# Patient Record
Sex: FEMALE | Race: WHITE | NOT HISPANIC OR LATINO | Employment: PART TIME
[De-identification: names, ages, dates, MRNs, and addresses within clinical notes are randomized per-mention and may not be internally consistent; named-entity substitution may affect disease eponyms.]

---

## 2012-08-21 LAB — COLONOSCOPY STUDY: NORMAL

## 2017-05-24 ENCOUNTER — IMAGING SERVICES (OUTPATIENT)
Dept: OTHER | Age: 72
End: 2017-05-24

## 2017-05-24 ENCOUNTER — HOSPITAL (OUTPATIENT)
Dept: OTHER | Age: 72
End: 2017-05-24
Attending: INTERNAL MEDICINE

## 2017-05-24 ENCOUNTER — LAB SERVICES (OUTPATIENT)
Dept: OTHER | Age: 72
End: 2017-05-24

## 2017-06-05 ENCOUNTER — CHARTING TRANS (OUTPATIENT)
Dept: OTHER | Age: 72
End: 2017-06-05

## 2017-06-05 LAB
ALBUMIN SERPL-MCNC: 3.9 G/DL (ref 3.6–5.1)
ALBUMIN/GLOB SERPL: 1.1 (ref 1–2.4)
ALP SERPL-CCNC: 60 UNITS/L (ref 45–117)
ALT SERPL-CCNC: 31 UNITS/L
ANION GAP SERPL CALC-SCNC: 11 MMOL/L (ref 10–20)
AST SERPL-CCNC: 21 UNITS/L
BASOPHILS # BLD: 0.1 K/MCL (ref 0–0.3)
BASOPHILS NFR BLD: 1 %
BILIRUB SERPL-MCNC: 0.4 MG/DL (ref 0.2–1)
BUN SERPL-MCNC: 24 MG/DL (ref 6–20)
BUN/CREAT SERPL: 28 (ref 7–25)
CALCIUM SERPL-MCNC: 9 MG/DL (ref 8.4–10.2)
CHLORIDE SERPL-SCNC: 103 MMOL/L (ref 98–107)
CHOLEST SERPL-MCNC: 162 MG/DL
CHOLEST/HDLC SERPL: 2.1
CO2 SERPL-SCNC: 30 MMOL/L (ref 21–32)
CREAT SERPL-MCNC: 0.86 MG/DL (ref 0.51–0.95)
DIFFERENTIAL METHOD BLD: ABNORMAL
EOSINOPHIL # BLD: 0.2 K/MCL (ref 0.1–0.5)
EOSINOPHIL NFR BLD: 2 %
ERYTHROCYTE [DISTWIDTH] IN BLOOD: 13.9 % (ref 11–15)
GLOBULIN SER-MCNC: 3.6 G/DL (ref 2–4)
GLUCOSE SERPL-MCNC: 99 MG/DL (ref 65–99)
HBA1C MFR BLD: 6.1 % (ref 4.5–5.6)
HDLC SERPL-MCNC: 78 MG/DL
HEMATOCRIT: 41.1 % (ref 36–46.5)
HEMOGLOBIN: 13.5 G/DL (ref 12–15.5)
LDLC SERPL CALC-MCNC: 60 MG/DL
LENGTH OF FAST TIME PATIENT: 12 HRS
LENGTH OF FAST TIME PATIENT: 12 HRS
LYMPHOCYTES # BLD: 2 K/MCL (ref 1–4)
LYMPHOCYTES NFR BLD: 24 %
MEAN CORPUSCULAR HEMOGLOBIN: 30.5 PG (ref 26–34)
MEAN CORPUSCULAR HGB CONC: 32.8 G/DL (ref 32–36.5)
MEAN CORPUSCULAR VOLUME: 92.8 FL (ref 78–100)
MONOCYTES # BLD: 0.6 K/MCL (ref 0.3–0.9)
MONOCYTES NFR BLD: 7 %
NEUTROPHILS # BLD: 5.6 K/MCL (ref 1.8–7.7)
NEUTROPHILS NFR BLD: 66 %
NONHDLC SERPL-MCNC: 84 MG/DL
PLATELET COUNT: 195 K/MCL (ref 140–450)
POTASSIUM SERPL-SCNC: 4.1 MMOL/L (ref 3.4–5.1)
RED CELL COUNT: 4.43 MIL/MCL (ref 4–5.2)
SODIUM SERPL-SCNC: 140 MMOL/L (ref 135–145)
TOTAL PROTEIN: 7.5 G/DL (ref 6.4–8.2)
TRIGL SERPL-MCNC: 120 MG/DL
TSH SERPL-ACNC: 3.14 MCUNITS/ML (ref 0.35–5)
WHITE BLOOD COUNT: 8.3 K/MCL (ref 4.2–11)

## 2017-06-05 ASSESSMENT — PAIN SCALES - GENERAL: PAINLEVEL_OUTOF10: 0

## 2017-07-12 ENCOUNTER — CHARTING TRANS (OUTPATIENT)
Dept: OTHER | Age: 72
End: 2017-07-12

## 2017-07-12 ASSESSMENT — PAIN SCALES - GENERAL: PAINLEVEL_OUTOF10: 0

## 2017-12-04 ENCOUNTER — CHARTING TRANS (OUTPATIENT)
Dept: OTHER | Age: 72
End: 2017-12-04

## 2017-12-06 ENCOUNTER — LAB SERVICES (OUTPATIENT)
Dept: OTHER | Age: 72
End: 2017-12-06

## 2017-12-14 LAB — HBA1C MFR BLD: 6.5 % (ref 4.5–5.6)

## 2018-01-10 ENCOUNTER — CHARTING TRANS (OUTPATIENT)
Dept: OTHER | Age: 73
End: 2018-01-10

## 2018-03-19 ENCOUNTER — LAB SERVICES (OUTPATIENT)
Dept: OTHER | Age: 73
End: 2018-03-19

## 2018-03-27 ENCOUNTER — CHARTING TRANS (OUTPATIENT)
Dept: OTHER | Age: 73
End: 2018-03-27

## 2018-03-27 LAB — HBA1C MFR BLD: 5.9 % (ref 4.5–5.6)

## 2018-07-09 ENCOUNTER — LAB SERVICES (OUTPATIENT)
Dept: OTHER | Age: 73
End: 2018-07-09

## 2018-07-09 ENCOUNTER — HOSPITAL (OUTPATIENT)
Dept: OTHER | Age: 73
End: 2018-07-09
Attending: INTERNAL MEDICINE

## 2018-07-09 ENCOUNTER — IMAGING SERVICES (OUTPATIENT)
Dept: OTHER | Age: 73
End: 2018-07-09

## 2018-07-15 ENCOUNTER — CHARTING TRANS (OUTPATIENT)
Dept: OTHER | Age: 73
End: 2018-07-15

## 2018-07-15 LAB
ALBUMIN SERPL-MCNC: 4.1 G/DL (ref 3.6–5.1)
ALBUMIN/GLOB SERPL: 1.1 (ref 1–2.4)
ALP SERPL-CCNC: 58 UNITS/L (ref 45–117)
ALT SERPL-CCNC: 27 UNITS/L
ANION GAP SERPL CALC-SCNC: 14 MMOL/L (ref 10–20)
AST SERPL-CCNC: 19 UNITS/L
BASOPHILS # BLD: 0.1 K/MCL (ref 0–0.3)
BASOPHILS NFR BLD: 1 %
BILIRUB SERPL-MCNC: 0.5 MG/DL (ref 0.2–1)
BUN SERPL-MCNC: 22 MG/DL (ref 6–20)
BUN/CREAT SERPL: 24 (ref 7–25)
CALCIUM SERPL-MCNC: 9.4 MG/DL (ref 8.4–10.2)
CHLORIDE SERPL-SCNC: 102 MMOL/L (ref 98–107)
CHOLEST SERPL-MCNC: 154 MG/DL
CHOLEST/HDLC SERPL: 2.1
CO2 SERPL-SCNC: 29 MMOL/L (ref 21–32)
CREAT SERPL-MCNC: 0.91 MG/DL (ref 0.51–0.95)
DIFFERENTIAL METHOD BLD: ABNORMAL
EOSINOPHIL # BLD: 0.1 K/MCL (ref 0.1–0.5)
EOSINOPHIL NFR BLD: 2 %
ERYTHROCYTE [DISTWIDTH] IN BLOOD: 14.3 % (ref 11–15)
GLOBULIN SER-MCNC: 3.8 G/DL (ref 2–4)
GLUCOSE SERPL-MCNC: 89 MG/DL (ref 65–99)
HBA1C MFR BLD: 5.7 % (ref 4.5–5.6)
HDLC SERPL-MCNC: 73 MG/DL
HEMATOCRIT: 41 % (ref 36–46.5)
HEMOGLOBIN: 12.7 G/DL (ref 12–15.5)
IMM GRANULOCYTES # BLD AUTO: 0.1 K/MCL (ref 0–0.2)
IMM GRANULOCYTES NFR BLD: 1 %
LDLC SERPL CALC-MCNC: 57 MG/DL
LENGTH OF FAST TIME PATIENT: 12 HRS
LENGTH OF FAST TIME PATIENT: 12 HRS
LYMPHOCYTES # BLD: 2 K/MCL (ref 1–4)
LYMPHOCYTES NFR BLD: 25 %
MEAN CORPUSCULAR HEMOGLOBIN: 29.6 PG (ref 26–34)
MEAN CORPUSCULAR HGB CONC: 31 G/DL (ref 32–36.5)
MEAN CORPUSCULAR VOLUME: 95.6 FL (ref 78–100)
MONOCYTES # BLD: 0.7 K/MCL (ref 0.3–0.9)
MONOCYTES NFR BLD: 9 %
NEUTROPHILS # BLD: 5.1 K/MCL (ref 1.8–7.7)
NEUTROPHILS NFR BLD: 62 %
NONHDLC SERPL-MCNC: 81 MG/DL
NRBC (NRBCRE): 0 /100 WBC
PLATELET COUNT: 205 K/MCL (ref 140–450)
POTASSIUM SERPL-SCNC: 4 MMOL/L (ref 3.4–5.1)
RED CELL COUNT: 4.29 MIL/MCL (ref 4–5.2)
SODIUM SERPL-SCNC: 141 MMOL/L (ref 135–145)
TOTAL PROTEIN: 7.9 G/DL (ref 6.4–8.2)
TRIGL SERPL-MCNC: 119 MG/DL
TSH SERPL-ACNC: 2.23 MCUNITS/ML (ref 0.35–5)
WHITE BLOOD COUNT: 8.1 K/MCL (ref 4.2–11)

## 2018-07-16 ENCOUNTER — CHARTING TRANS (OUTPATIENT)
Dept: OTHER | Age: 73
End: 2018-07-16

## 2018-07-16 ENCOUNTER — MYAURORA ACCOUNT LINK (OUTPATIENT)
Dept: OTHER | Age: 73
End: 2018-07-16

## 2018-07-16 ASSESSMENT — PAIN SCALES - GENERAL: PAINLEVEL_OUTOF10: 0

## 2018-10-31 VITALS — TEMPERATURE: 99.2 F | WEIGHT: 208 LBS | BODY MASS INDEX: 34.66 KG/M2 | HEIGHT: 65 IN

## 2018-11-02 VITALS
BODY MASS INDEX: 35.36 KG/M2 | HEART RATE: 76 BPM | OXYGEN SATURATION: 98 % | RESPIRATION RATE: 14 BRPM | TEMPERATURE: 97.8 F | DIASTOLIC BLOOD PRESSURE: 66 MMHG | SYSTOLIC BLOOD PRESSURE: 126 MMHG | HEIGHT: 66 IN | WEIGHT: 220 LBS

## 2018-11-03 VITALS
OXYGEN SATURATION: 97 % | HEART RATE: 86 BPM | TEMPERATURE: 98.3 F | WEIGHT: 216 LBS | RESPIRATION RATE: 12 BRPM | BODY MASS INDEX: 35.99 KG/M2 | HEIGHT: 65 IN

## 2018-11-03 VITALS
WEIGHT: 216 LBS | RESPIRATION RATE: 12 BRPM | BODY MASS INDEX: 35.99 KG/M2 | HEART RATE: 92 BPM | OXYGEN SATURATION: 97 % | TEMPERATURE: 98.7 F | HEIGHT: 65 IN

## 2018-12-30 DIAGNOSIS — E11.8 CONTROLLED DIABETES MELLITUS TYPE 2 WITH COMPLICATIONS, UNSPECIFIED WHETHER LONG TERM INSULIN USE (CMD): Primary | ICD-10-CM

## 2019-01-01 ENCOUNTER — EXTERNAL RECORD (OUTPATIENT)
Dept: HEALTH INFORMATION MANAGEMENT | Facility: OTHER | Age: 74
End: 2019-01-01

## 2019-01-10 ENCOUNTER — E-ADVICE (OUTPATIENT)
Dept: INTERNAL MEDICINE | Age: 74
End: 2019-01-10

## 2019-01-19 ENCOUNTER — E-ADVICE (OUTPATIENT)
Dept: INTERNAL MEDICINE | Age: 74
End: 2019-01-19

## 2019-02-06 ENCOUNTER — E-ADVICE (OUTPATIENT)
Dept: INTERNAL MEDICINE | Age: 74
End: 2019-02-06

## 2019-02-12 ENCOUNTER — TELEPHONE (OUTPATIENT)
Dept: SCHEDULING | Age: 74
End: 2019-02-12

## 2019-02-14 ENCOUNTER — E-ADVICE (OUTPATIENT)
Dept: INTERNAL MEDICINE | Age: 74
End: 2019-02-14

## 2019-02-19 LAB — HBA1C MFR BLD: 5.9 % (ref 4.5–5.6)

## 2019-04-04 RX ORDER — METFORMIN HYDROCHLORIDE 500 MG/1
TABLET, EXTENDED RELEASE ORAL
COMMUNITY
Start: 2018-05-21 | End: 2019-05-20 | Stop reason: SDUPTHER

## 2019-04-04 RX ORDER — LOVASTATIN 20 MG/1
TABLET ORAL
COMMUNITY
Start: 2018-05-07 | End: 2019-05-05 | Stop reason: SDUPTHER

## 2019-04-04 RX ORDER — CITALOPRAM 20 MG/1
TABLET ORAL
Qty: 90 TABLET | Refills: 3 | Status: SHIPPED | OUTPATIENT
Start: 2019-04-04 | End: 2019-07-27 | Stop reason: SDUPTHER

## 2019-04-04 RX ORDER — MULTIVITAMIN,THER AND MINERALS
TABLET ORAL DAILY
COMMUNITY
End: 2021-09-26 | Stop reason: CLARIF

## 2019-04-04 RX ORDER — HYDROCORTISONE ACETATE 0.5 %
CREAM (GRAM) TOPICAL DAILY
COMMUNITY

## 2019-04-04 RX ORDER — RANITIDINE 300 MG/1
TABLET ORAL DAILY
COMMUNITY
Start: 2018-07-16 | End: 2019-07-08 | Stop reason: SDUPTHER

## 2019-04-04 RX ORDER — HYDROCHLOROTHIAZIDE 12.5 MG/1
CAPSULE, GELATIN COATED ORAL
COMMUNITY
Start: 2018-07-10 | End: 2019-07-08 | Stop reason: SDUPTHER

## 2019-04-04 RX ORDER — TRIAMCINOLONE ACETONIDE 5 MG/G
CREAM TOPICAL
COMMUNITY
Start: 2018-07-17 | End: 2019-07-17

## 2019-04-04 RX ORDER — QUINAPRIL 20 MG/1
TABLET ORAL DAILY
COMMUNITY
Start: 2018-07-10 | End: 2019-07-08 | Stop reason: SDUPTHER

## 2019-05-01 ENCOUNTER — E-ADVICE (OUTPATIENT)
Dept: INTERNAL MEDICINE | Age: 74
End: 2019-05-01

## 2019-05-06 RX ORDER — LOVASTATIN 20 MG/1
TABLET ORAL
Qty: 180 TABLET | Refills: 3 | Status: SHIPPED | OUTPATIENT
Start: 2019-05-06 | End: 2019-07-27 | Stop reason: SDUPTHER

## 2019-05-14 DIAGNOSIS — R69 TAKING MULTIPLE MEDICATIONS FOR CHRONIC DISEASE: Primary | ICD-10-CM

## 2019-05-14 DIAGNOSIS — E11.21 CONTROLLED TYPE 2 DIABETES MELLITUS WITH DIABETIC NEPHROPATHY, WITHOUT LONG-TERM CURRENT USE OF INSULIN (CMD): ICD-10-CM

## 2019-05-14 DIAGNOSIS — E78.00 HYPERCHOLESTEROLEMIA: ICD-10-CM

## 2019-05-22 RX ORDER — METFORMIN HYDROCHLORIDE 500 MG/1
TABLET, EXTENDED RELEASE ORAL
Qty: 90 TABLET | Refills: 1 | Status: SHIPPED | OUTPATIENT
Start: 2019-05-22 | End: 2019-07-27 | Stop reason: SDUPTHER

## 2019-06-24 ENCOUNTER — TELEPHONE (OUTPATIENT)
Dept: SCHEDULING | Age: 74
End: 2019-06-24

## 2019-06-26 ENCOUNTER — E-ADVICE (OUTPATIENT)
Dept: INTERNAL MEDICINE | Age: 74
End: 2019-06-26

## 2019-06-28 ENCOUNTER — TELEPHONE (OUTPATIENT)
Dept: SCHEDULING | Age: 74
End: 2019-06-28

## 2019-06-28 DIAGNOSIS — Z12.31 VISIT FOR SCREENING MAMMOGRAM: Primary | ICD-10-CM

## 2019-07-08 RX ORDER — RANITIDINE 300 MG/1
TABLET ORAL
Qty: 90 TABLET | Refills: 3 | Status: SHIPPED | OUTPATIENT
Start: 2019-07-08 | End: 2019-07-27 | Stop reason: SDUPTHER

## 2019-07-08 RX ORDER — QUINAPRIL 20 MG/1
TABLET ORAL
Qty: 90 TABLET | Refills: 3 | Status: SHIPPED | OUTPATIENT
Start: 2019-07-08 | End: 2019-07-27 | Stop reason: SDUPTHER

## 2019-07-08 RX ORDER — HYDROCHLOROTHIAZIDE 12.5 MG/1
CAPSULE, GELATIN COATED ORAL
Qty: 90 CAPSULE | Refills: 3 | Status: SHIPPED | OUTPATIENT
Start: 2019-07-08 | End: 2019-07-27 | Stop reason: SDUPTHER

## 2019-07-12 ENCOUNTER — HOSPITAL (OUTPATIENT)
Dept: OTHER | Age: 74
End: 2019-07-12
Attending: INTERNAL MEDICINE

## 2019-07-17 ENCOUNTER — TELEPHONE (OUTPATIENT)
Dept: SCHEDULING | Age: 74
End: 2019-07-17

## 2019-07-22 ENCOUNTER — LAB SERVICES (OUTPATIENT)
Dept: LAB | Age: 74
End: 2019-07-22

## 2019-07-22 DIAGNOSIS — E78.00 HYPERCHOLESTEROLEMIA: ICD-10-CM

## 2019-07-22 DIAGNOSIS — R69 TAKING MULTIPLE MEDICATIONS FOR CHRONIC DISEASE: ICD-10-CM

## 2019-07-22 DIAGNOSIS — E11.21 CONTROLLED TYPE 2 DIABETES MELLITUS WITH DIABETIC NEPHROPATHY, WITHOUT LONG-TERM CURRENT USE OF INSULIN (CMD): ICD-10-CM

## 2019-07-23 LAB
ALBUMIN SERPL-MCNC: 3.8 G/DL (ref 3.6–5.1)
ALBUMIN/GLOB SERPL: 1.2 {RATIO} (ref 1–2.4)
ALP SERPL-CCNC: 59 UNITS/L (ref 45–117)
ALT SERPL-CCNC: 31 UNITS/L
ANION GAP SERPL CALC-SCNC: 10 MMOL/L (ref 10–20)
AST SERPL-CCNC: 19 UNITS/L
BASOPHILS # BLD AUTO: 0.1 K/MCL (ref 0–0.3)
BASOPHILS NFR BLD AUTO: 1 %
BILIRUB SERPL-MCNC: 0.4 MG/DL (ref 0.2–1)
BUN SERPL-MCNC: 20 MG/DL (ref 6–20)
BUN/CREAT SERPL: 24 (ref 7–25)
CALCIUM SERPL-MCNC: 9.1 MG/DL (ref 8.4–10.2)
CHLORIDE SERPL-SCNC: 106 MMOL/L (ref 98–107)
CHOLEST SERPL-MCNC: 156 MG/DL
CHOLEST/HDLC SERPL: 2.1 {RATIO}
CO2 SERPL-SCNC: 28 MMOL/L (ref 21–32)
CREAT SERPL-MCNC: 0.85 MG/DL (ref 0.51–0.95)
DIFFERENTIAL METHOD BLD: ABNORMAL
EOSINOPHIL # BLD AUTO: 0.2 K/MCL (ref 0.1–0.5)
EOSINOPHIL NFR SPEC: 3 %
ERYTHROCYTE [DISTWIDTH] IN BLOOD: 14.1 % (ref 11–15)
FASTING STATUS PATIENT QL REPORTED: 13 HRS
GLOBULIN SER-MCNC: 3.1 G/DL (ref 2–4)
GLUCOSE SERPL-MCNC: 92 MG/DL (ref 65–99)
HBA1C MFR BLD: 5.9 % (ref 4.5–5.6)
HCT VFR BLD CALC: 40.6 % (ref 36–46.5)
HDLC SERPL-MCNC: 76 MG/DL
HGB BLD-MCNC: 12.9 G/DL (ref 12–15.5)
IMM GRANULOCYTES # BLD AUTO: 0.1 K/MCL (ref 0–0.2)
IMM GRANULOCYTES NFR BLD: 1 %
LDLC SERPL-MCNC: 58 MG/DL
LENGTH OF FAST TIME PATIENT: 13 HRS
LYMPHOCYTES # BLD MANUAL: 1.9 K/MCL (ref 1–4)
LYMPHOCYTES NFR BLD MANUAL: 26 %
MCH RBC QN AUTO: 30.1 PG (ref 26–34)
MCHC RBC AUTO-ENTMCNC: 31.8 G/DL (ref 32–36.5)
MCV RBC AUTO: 94.6 FL (ref 78–100)
MONOCYTES # BLD MANUAL: 0.6 K/MCL (ref 0.3–0.9)
MONOCYTES NFR BLD MANUAL: 8 %
NEUTROPHILS # BLD: 4.5 K/MCL (ref 1.8–7.7)
NEUTROPHILS NFR BLD AUTO: 61 %
NONHDLC SERPL-MCNC: 80 MG/DL
NRBC BLD MANUAL-RTO: 0 /100 WBC
PLATELET # BLD: 196 K/MCL (ref 140–450)
POTASSIUM SERPL-SCNC: 4 MMOL/L (ref 3.4–5.1)
PROT SERPL-MCNC: 6.9 G/DL (ref 6.4–8.2)
RBC # BLD: 4.29 MIL/MCL (ref 4–5.2)
SODIUM SERPL-SCNC: 140 MMOL/L (ref 135–145)
TRIGL SERPL-MCNC: 111 MG/DL
TSH SERPL-ACNC: 2.95 MCUNITS/ML (ref 0.35–5)
WBC # BLD: 7.4 K/MCL (ref 4.2–11)

## 2019-07-25 PROBLEM — K21.9 GERD (GASTROESOPHAGEAL REFLUX DISEASE): Status: ACTIVE | Noted: 2018-07-16

## 2019-07-25 PROBLEM — Z28.21 REFUSED DIPHTHERIA-TETANUS VACCINE: Status: ACTIVE | Noted: 2018-01-10

## 2019-07-25 PROBLEM — E11.9 TYPE 2 DIABETES MELLITUS WITHOUT COMPLICATIONS (CMD): Status: ACTIVE | Noted: 2018-01-10

## 2019-07-27 ENCOUNTER — OFFICE VISIT (OUTPATIENT)
Dept: INTERNAL MEDICINE | Age: 74
End: 2019-07-27

## 2019-07-27 DIAGNOSIS — I10 ESSENTIAL HYPERTENSION: ICD-10-CM

## 2019-07-27 DIAGNOSIS — E11.9 TYPE 2 DIABETES MELLITUS WITHOUT COMPLICATION, WITHOUT LONG-TERM CURRENT USE OF INSULIN (CMD): ICD-10-CM

## 2019-07-27 DIAGNOSIS — J30.9 ALLERGIC RHINITIS, UNSPECIFIED SEASONALITY, UNSPECIFIED TRIGGER: ICD-10-CM

## 2019-07-27 DIAGNOSIS — Z78.0 MENOPAUSE: Primary | ICD-10-CM

## 2019-07-27 DIAGNOSIS — M94.9 DISORDER OF BONE AND CARTILAGE: ICD-10-CM

## 2019-07-27 DIAGNOSIS — E78.00 HYPERCHOLESTEROLEMIA: ICD-10-CM

## 2019-07-27 DIAGNOSIS — M89.9 DISORDER OF BONE AND CARTILAGE: ICD-10-CM

## 2019-07-27 DIAGNOSIS — Z28.20 VACCINE REFUSED BY PATIENT: ICD-10-CM

## 2019-07-27 DIAGNOSIS — K21.9 GASTROESOPHAGEAL REFLUX DISEASE WITHOUT ESOPHAGITIS: ICD-10-CM

## 2019-07-27 PROCEDURE — 99215 OFFICE O/P EST HI 40 MIN: CPT | Performed by: INTERNAL MEDICINE

## 2019-07-27 RX ORDER — CITALOPRAM 20 MG/1
20 TABLET ORAL DAILY
Qty: 90 TABLET | Refills: 3 | Status: SHIPPED | OUTPATIENT
Start: 2019-07-27 | End: 2020-12-21

## 2019-07-27 RX ORDER — METFORMIN HYDROCHLORIDE 500 MG/1
500 TABLET, EXTENDED RELEASE ORAL
Qty: 90 TABLET | Refills: 3 | Status: SHIPPED | OUTPATIENT
Start: 2019-07-27 | End: 2020-09-09

## 2019-07-27 RX ORDER — RANITIDINE 300 MG/1
300 TABLET ORAL DAILY
Qty: 90 TABLET | Refills: 3 | Status: SHIPPED | OUTPATIENT
Start: 2019-07-27 | End: 2021-09-27 | Stop reason: CLARIF

## 2019-07-27 RX ORDER — LOVASTATIN 20 MG/1
40 TABLET ORAL DAILY
Qty: 180 TABLET | Refills: 3 | Status: SHIPPED | OUTPATIENT
Start: 2019-07-27 | End: 2020-10-23

## 2019-07-27 RX ORDER — HYDROCHLOROTHIAZIDE 12.5 MG/1
12.5 CAPSULE, GELATIN COATED ORAL DAILY
Qty: 90 CAPSULE | Refills: 3 | Status: SHIPPED | OUTPATIENT
Start: 2019-07-27 | End: 2020-12-21

## 2019-07-27 RX ORDER — QUINAPRIL 20 MG/1
20 TABLET ORAL DAILY
Qty: 90 TABLET | Refills: 3 | Status: SHIPPED | OUTPATIENT
Start: 2019-07-27 | End: 2020-12-21

## 2019-07-27 ASSESSMENT — ENCOUNTER SYMPTOMS
CONSTITUTIONAL NEGATIVE: 1
EYES NEGATIVE: 1
ALLERGIC/IMMUNOLOGIC NEGATIVE: 1
PSYCHIATRIC NEGATIVE: 1
HEMATOLOGIC/LYMPHATIC NEGATIVE: 1
ENDOCRINE NEGATIVE: 1
GASTROINTESTINAL NEGATIVE: 1
RESPIRATORY NEGATIVE: 1
NEUROLOGICAL NEGATIVE: 1

## 2019-07-27 ASSESSMENT — PAIN SCALES - GENERAL: PAINLEVEL: 0

## 2019-09-13 ENCOUNTER — E-ADVICE (OUTPATIENT)
Dept: INTERNAL MEDICINE | Age: 74
End: 2019-09-13

## 2019-09-14 ENCOUNTER — E-ADVICE (OUTPATIENT)
Dept: INTERNAL MEDICINE | Age: 74
End: 2019-09-14

## 2019-09-14 DIAGNOSIS — E11.9 TYPE 2 DIABETES MELLITUS WITHOUT COMPLICATION, WITHOUT LONG-TERM CURRENT USE OF INSULIN (CMD): Primary | ICD-10-CM

## 2019-09-14 RX ORDER — PANTOPRAZOLE SODIUM 40 MG/1
40 TABLET, DELAYED RELEASE ORAL DAILY
Qty: 90 TABLET | Refills: 3 | Status: SHIPPED | OUTPATIENT
Start: 2019-09-14 | End: 2020-09-09

## 2019-10-23 ENCOUNTER — HOSPITAL (OUTPATIENT)
Dept: OTHER | Age: 74
End: 2019-10-23
Attending: INTERNAL MEDICINE

## 2019-10-23 ENCOUNTER — LAB SERVICES (OUTPATIENT)
Dept: LAB | Age: 74
End: 2019-10-23

## 2019-10-23 DIAGNOSIS — E11.9 TYPE 2 DIABETES MELLITUS WITHOUT COMPLICATION, WITHOUT LONG-TERM CURRENT USE OF INSULIN (CMD): ICD-10-CM

## 2019-10-24 ENCOUNTER — TELEPHONE (OUTPATIENT)
Dept: FAMILY MEDICINE | Age: 74
End: 2019-10-24

## 2019-10-24 LAB — HBA1C MFR BLD: 5.8 % (ref 4.5–5.6)

## 2019-10-31 ENCOUNTER — TELEPHONE (OUTPATIENT)
Dept: FAMILY MEDICINE | Age: 74
End: 2019-10-31

## 2019-11-04 ENCOUNTER — E-ADVICE (OUTPATIENT)
Dept: FAMILY MEDICINE | Age: 74
End: 2019-11-04

## 2020-02-17 ENCOUNTER — LAB SERVICES (OUTPATIENT)
Dept: LAB | Age: 75
End: 2020-02-17

## 2020-02-17 DIAGNOSIS — E11.9 TYPE 2 DIABETES MELLITUS WITHOUT COMPLICATION, WITHOUT LONG-TERM CURRENT USE OF INSULIN (CMD): ICD-10-CM

## 2020-02-18 DIAGNOSIS — E11.9 TYPE 2 DIABETES MELLITUS WITHOUT COMPLICATION, WITHOUT LONG-TERM CURRENT USE OF INSULIN (CMD): Primary | ICD-10-CM

## 2020-02-18 LAB — HBA1C MFR BLD: 6 % (ref 4.5–5.6)

## 2020-02-25 ENCOUNTER — WALK IN (OUTPATIENT)
Dept: URGENT CARE | Age: 75
End: 2020-02-25

## 2020-02-25 DIAGNOSIS — J02.9 PHARYNGITIS, UNSPECIFIED ETIOLOGY: Primary | ICD-10-CM

## 2020-02-25 LAB
INTERNAL PROCEDURAL CONTROLS ACCEPTABLE: YES
S PYO AG THROAT QL IA.RAPID: NEGATIVE

## 2020-02-25 PROCEDURE — 87880 STREP A ASSAY W/OPTIC: CPT | Performed by: NURSE PRACTITIONER

## 2020-02-25 PROCEDURE — 99203 OFFICE O/P NEW LOW 30 MIN: CPT | Performed by: NURSE PRACTITIONER

## 2020-02-25 RX ORDER — AMOXICILLIN 500 MG/1
500 CAPSULE ORAL 2 TIMES DAILY
Qty: 20 CAPSULE | Refills: 0 | Status: SHIPPED | OUTPATIENT
Start: 2020-02-26 | End: 2020-03-07

## 2020-02-25 SDOH — HEALTH STABILITY: MENTAL HEALTH: HOW OFTEN DO YOU HAVE A DRINK CONTAINING ALCOHOL?: MONTHLY OR LESS

## 2020-02-25 ASSESSMENT — ENCOUNTER SYMPTOMS
SORE THROAT: 1
FEVER: 1
NAUSEA: 0
CHILLS: 0
SHORTNESS OF BREATH: 0
COUGH: 0
DIZZINESS: 0
RHINORRHEA: 0
ABDOMINAL PAIN: 0
TROUBLE SWALLOWING: 1
HEADACHES: 0
WHEEZING: 0
VOMITING: 0

## 2020-02-25 ASSESSMENT — PAIN SCALES - GENERAL: PAINLEVEL: 7-8

## 2020-06-12 ENCOUNTER — LAB SERVICES (OUTPATIENT)
Dept: LAB | Age: 75
End: 2020-06-12

## 2020-06-12 DIAGNOSIS — E11.9 TYPE 2 DIABETES MELLITUS WITHOUT COMPLICATION, WITHOUT LONG-TERM CURRENT USE OF INSULIN (CMD): ICD-10-CM

## 2020-06-13 LAB — HBA1C MFR BLD: 5.9 % (ref 4.5–5.6)

## 2020-06-16 DIAGNOSIS — E78.00 HYPERCHOLESTEROLEMIA: ICD-10-CM

## 2020-06-16 DIAGNOSIS — K21.9 GASTROESOPHAGEAL REFLUX DISEASE WITHOUT ESOPHAGITIS: ICD-10-CM

## 2020-06-16 DIAGNOSIS — E11.9 TYPE 2 DIABETES MELLITUS WITHOUT COMPLICATION, WITHOUT LONG-TERM CURRENT USE OF INSULIN (CMD): ICD-10-CM

## 2020-06-16 DIAGNOSIS — I10 ESSENTIAL HYPERTENSION: Primary | ICD-10-CM

## 2020-06-18 ENCOUNTER — E-ADVICE (OUTPATIENT)
Dept: INTERNAL MEDICINE | Age: 75
End: 2020-06-18

## 2020-08-25 DIAGNOSIS — Z12.31 ENCOUNTER FOR SCREENING MAMMOGRAM FOR MALIGNANT NEOPLASM OF BREAST: Primary | ICD-10-CM

## 2020-08-31 ENCOUNTER — HOSPITAL ENCOUNTER (OUTPATIENT)
Dept: MAMMOGRAPHY | Age: 75
Discharge: HOME | End: 2020-08-31
Attending: INTERNAL MEDICINE

## 2020-08-31 DIAGNOSIS — Z12.31 ENCOUNTER FOR SCREENING MAMMOGRAM FOR MALIGNANT NEOPLASM OF BREAST: ICD-10-CM

## 2020-08-31 PROCEDURE — 77063 BREAST TOMOSYNTHESIS BI: CPT

## 2020-09-09 RX ORDER — PANTOPRAZOLE SODIUM 40 MG/1
TABLET, DELAYED RELEASE ORAL
Qty: 90 TABLET | Refills: 0 | Status: SHIPPED | OUTPATIENT
Start: 2020-09-09 | End: 2020-12-05

## 2020-09-09 RX ORDER — METFORMIN HYDROCHLORIDE 500 MG/1
TABLET, EXTENDED RELEASE ORAL
Qty: 90 TABLET | Refills: 0 | Status: SHIPPED | OUTPATIENT
Start: 2020-09-09 | End: 2020-12-21

## 2020-09-14 ENCOUNTER — TELEPHONE (OUTPATIENT)
Dept: SCHEDULING | Age: 75
End: 2020-09-14

## 2020-09-17 ENCOUNTER — LAB SERVICES (OUTPATIENT)
Dept: LAB | Age: 75
End: 2020-09-17

## 2020-09-17 DIAGNOSIS — E11.9 TYPE 2 DIABETES MELLITUS WITHOUT COMPLICATION, WITHOUT LONG-TERM CURRENT USE OF INSULIN (CMD): ICD-10-CM

## 2020-09-17 DIAGNOSIS — I10 ESSENTIAL HYPERTENSION: ICD-10-CM

## 2020-09-17 DIAGNOSIS — E78.00 HYPERCHOLESTEROLEMIA: ICD-10-CM

## 2020-09-17 DIAGNOSIS — K21.9 GASTROESOPHAGEAL REFLUX DISEASE WITHOUT ESOPHAGITIS: ICD-10-CM

## 2020-09-17 LAB
ALBUMIN SERPL-MCNC: 3.8 G/DL (ref 3.6–5.1)
ALBUMIN/GLOB SERPL: 1 {RATIO} (ref 1–2.4)
ALP SERPL-CCNC: 66 UNITS/L (ref 45–117)
ALT SERPL-CCNC: 31 UNITS/L
ANION GAP SERPL CALC-SCNC: 10 MMOL/L (ref 10–20)
AST SERPL-CCNC: 17 UNITS/L
BASOPHILS # BLD: 0.1 K/MCL (ref 0–0.3)
BASOPHILS NFR BLD: 1 %
BILIRUB SERPL-MCNC: 0.5 MG/DL (ref 0.2–1)
BUN SERPL-MCNC: 23 MG/DL (ref 6–20)
BUN/CREAT SERPL: 27 (ref 7–25)
CALCIUM SERPL-MCNC: 9.1 MG/DL (ref 8.4–10.2)
CHLORIDE SERPL-SCNC: 104 MMOL/L (ref 98–107)
CHOLEST SERPL-MCNC: 161 MG/DL
CHOLEST/HDLC SERPL: 2.4 {RATIO}
CO2 SERPL-SCNC: 29 MMOL/L (ref 21–32)
CREAT SERPL-MCNC: 0.85 MG/DL (ref 0.51–0.95)
DIFFERENTIAL METHOD BLD: ABNORMAL
EOSINOPHIL # BLD: 0.2 K/MCL (ref 0.1–0.5)
EOSINOPHIL NFR BLD: 2 %
ERYTHROCYTE [DISTWIDTH] IN BLOOD: 13.6 % (ref 11–15)
GLOBULIN SER-MCNC: 3.7 G/DL (ref 2–4)
GLUCOSE SERPL-MCNC: 93 MG/DL (ref 65–99)
HBA1C MFR BLD: 6.1 % (ref 4.5–5.6)
HCT VFR BLD CALC: 42.6 % (ref 36–46.5)
HDLC SERPL-MCNC: 68 MG/DL
HGB BLD-MCNC: 13.1 G/DL (ref 12–15.5)
IMM GRANULOCYTES # BLD AUTO: 0.1 K/MCL (ref 0–0.2)
IMM GRANULOCYTES NFR BLD: 1 %
LDLC SERPL CALC-MCNC: 71 MG/DL
LENGTH OF FAST TIME PATIENT: 12 HRS
LENGTH OF FAST TIME PATIENT: 12 HRS
LYMPHOCYTES # BLD: 2.1 K/MCL (ref 1–4)
LYMPHOCYTES NFR BLD: 27 %
MCH RBC QN AUTO: 28.9 PG (ref 26–34)
MCHC RBC AUTO-ENTMCNC: 30.8 G/DL (ref 32–36.5)
MCV RBC AUTO: 94 FL (ref 78–100)
MONOCYTES # BLD: 0.6 K/MCL (ref 0.3–0.9)
MONOCYTES NFR BLD: 7 %
NEUTROPHILS # BLD: 4.9 K/MCL (ref 1.8–7.7)
NEUTROPHILS NFR BLD: 62 %
NONHDLC SERPL-MCNC: 93 MG/DL
NRBC BLD MANUAL-RTO: 0 /100 WBC
PLATELET # BLD: 203 K/MCL (ref 140–450)
POTASSIUM SERPL-SCNC: 3.9 MMOL/L (ref 3.4–5.1)
PROT SERPL-MCNC: 7.5 G/DL (ref 6.4–8.2)
RBC # BLD: 4.53 MIL/MCL (ref 4–5.2)
SODIUM SERPL-SCNC: 139 MMOL/L (ref 135–145)
TRIGL SERPL-MCNC: 112 MG/DL
TSH SERPL-ACNC: 2.29 MCUNITS/ML (ref 0.35–5)
WBC # BLD: 7.9 K/MCL (ref 4.2–11)

## 2020-09-21 ENCOUNTER — APPOINTMENT (OUTPATIENT)
Dept: INTERNAL MEDICINE | Age: 75
End: 2020-09-21

## 2020-09-25 ENCOUNTER — OFFICE VISIT (OUTPATIENT)
Dept: INTERNAL MEDICINE | Age: 75
End: 2020-09-25

## 2020-09-25 VITALS
BODY MASS INDEX: 37.32 KG/M2 | HEIGHT: 65 IN | OXYGEN SATURATION: 99 % | HEART RATE: 91 BPM | TEMPERATURE: 98 F | DIASTOLIC BLOOD PRESSURE: 60 MMHG | WEIGHT: 224 LBS | SYSTOLIC BLOOD PRESSURE: 110 MMHG

## 2020-09-25 DIAGNOSIS — K21.9 GASTROESOPHAGEAL REFLUX DISEASE WITHOUT ESOPHAGITIS: ICD-10-CM

## 2020-09-25 DIAGNOSIS — I10 ESSENTIAL HYPERTENSION: ICD-10-CM

## 2020-09-25 DIAGNOSIS — E78.00 HYPERCHOLESTEROLEMIA: ICD-10-CM

## 2020-09-25 DIAGNOSIS — E11.9 TYPE 2 DIABETES MELLITUS WITHOUT COMPLICATION, WITHOUT LONG-TERM CURRENT USE OF INSULIN (CMD): ICD-10-CM

## 2020-09-25 DIAGNOSIS — J30.9 ALLERGIC RHINITIS, UNSPECIFIED SEASONALITY, UNSPECIFIED TRIGGER: ICD-10-CM

## 2020-09-25 DIAGNOSIS — Z28.20 VACCINE REFUSED BY PATIENT: ICD-10-CM

## 2020-09-25 DIAGNOSIS — N95.2 ATROPHIC VAGINITIS: Primary | ICD-10-CM

## 2020-09-25 PROCEDURE — 99215 OFFICE O/P EST HI 40 MIN: CPT | Performed by: INTERNAL MEDICINE

## 2020-09-25 RX ORDER — ESTRADIOL 10 UG/1
10 INSERT VAGINAL
Qty: 8 TABLET | Refills: 5 | Status: SHIPPED | OUTPATIENT
Start: 2020-09-25 | End: 2021-09-27 | Stop reason: SDUPTHER

## 2020-09-25 SDOH — HEALTH STABILITY: MENTAL HEALTH: HOW OFTEN DO YOU HAVE A DRINK CONTAINING ALCOHOL?: MONTHLY OR LESS

## 2020-09-25 ASSESSMENT — ENCOUNTER SYMPTOMS
ALLERGIC/IMMUNOLOGIC NEGATIVE: 1
HEMATOLOGIC/LYMPHATIC NEGATIVE: 1
CONSTITUTIONAL NEGATIVE: 1
EYES NEGATIVE: 1
ENDOCRINE NEGATIVE: 1
RESPIRATORY NEGATIVE: 1
GASTROINTESTINAL NEGATIVE: 1
PSYCHIATRIC NEGATIVE: 1
NEUROLOGICAL NEGATIVE: 1

## 2020-09-25 ASSESSMENT — PATIENT HEALTH QUESTIONNAIRE - PHQ9
1. LITTLE INTEREST OR PLEASURE IN DOING THINGS: NOT AT ALL
CLINICAL INTERPRETATION OF PHQ2 SCORE: NO FURTHER SCREENING NEEDED
2. FEELING DOWN, DEPRESSED OR HOPELESS: NOT AT ALL
SUM OF ALL RESPONSES TO PHQ9 QUESTIONS 1 AND 2: 0
CLINICAL INTERPRETATION OF PHQ9 SCORE: NO FURTHER SCREENING NEEDED
SUM OF ALL RESPONSES TO PHQ9 QUESTIONS 1 AND 2: 0

## 2020-10-23 RX ORDER — LOVASTATIN 20 MG/1
TABLET ORAL
Qty: 180 TABLET | Refills: 0 | Status: SHIPPED | OUTPATIENT
Start: 2020-10-23 | End: 2021-01-25

## 2020-11-25 ENCOUNTER — HOSPITAL ENCOUNTER (OUTPATIENT)
Age: 75
Discharge: HOME | End: 2020-11-25
Payer: MEDICARE

## 2020-11-25 VITALS
OXYGEN SATURATION: 98 % | TEMPERATURE: 99 F | WEIGHT: 218 LBS | HEART RATE: 83 BPM | RESPIRATION RATE: 18 BRPM | SYSTOLIC BLOOD PRESSURE: 156 MMHG | BODY MASS INDEX: 36.32 KG/M2 | HEIGHT: 65 IN | DIASTOLIC BLOOD PRESSURE: 76 MMHG

## 2020-11-25 DIAGNOSIS — H92.01 RIGHT EAR PAIN: Primary | ICD-10-CM

## 2020-11-25 PROCEDURE — 99204 OFFICE O/P NEW MOD 45 MIN: CPT

## 2020-11-25 PROCEDURE — 99203 OFFICE O/P NEW LOW 30 MIN: CPT

## 2020-11-25 RX ORDER — METFORMIN HYDROCHLORIDE 500 MG/1
500 TABLET, EXTENDED RELEASE ORAL
COMMUNITY
Start: 2020-09-09

## 2020-11-25 RX ORDER — PANTOPRAZOLE SODIUM 40 MG/1
40 TABLET, DELAYED RELEASE ORAL DAILY
COMMUNITY
Start: 2020-09-09

## 2020-11-25 RX ORDER — NAPROXEN 500 MG/1
500 TABLET ORAL 2 TIMES DAILY PRN
Qty: 20 TABLET | Refills: 0 | Status: SHIPPED | OUTPATIENT
Start: 2020-11-25 | End: 2020-12-02

## 2020-11-25 NOTE — ED PROVIDER NOTES
Patient Seen in: [facility]      History   Patient presents with:  Ear Problem Pain    Stated Complaint: EAR PAIN    HPI  45-year-old female who currently is on amoxicillin for right sided lower gum infection after she stabbed herself wit distress. Appearance: She is well-developed. She is not ill-appearing or toxic-appearing. HENT:      Head: Normocephalic and atraumatic.       Right Ear: Tympanic membrane, ear canal and external ear normal.      Left Ear: Tympanic membrane, ear canal

## 2020-11-25 NOTE — ED INITIAL ASSESSMENT (HPI)
The patient is here for evaluation of right ear pain. Her right ear ache started last night.  She states she poked her right lower gum with a tooth pick about 1 week ago and she started amoxicillin 500mg PO BID, and she saw her dentist yesterday and will fo

## 2020-12-05 RX ORDER — PANTOPRAZOLE SODIUM 40 MG/1
TABLET, DELAYED RELEASE ORAL
Qty: 90 TABLET | Refills: 3 | Status: SHIPPED | OUTPATIENT
Start: 2020-12-05 | End: 2021-09-27 | Stop reason: SDUPTHER

## 2020-12-21 RX ORDER — QUINAPRIL 20 MG/1
TABLET ORAL
Qty: 90 TABLET | Refills: 0 | Status: SHIPPED | OUTPATIENT
Start: 2020-12-21 | End: 2021-03-23

## 2020-12-21 RX ORDER — HYDROCHLOROTHIAZIDE 12.5 MG/1
CAPSULE, GELATIN COATED ORAL
Qty: 90 CAPSULE | Refills: 0 | Status: SHIPPED | OUTPATIENT
Start: 2020-12-21 | End: 2021-03-23

## 2020-12-21 RX ORDER — METFORMIN HYDROCHLORIDE 500 MG/1
TABLET, EXTENDED RELEASE ORAL
Qty: 90 TABLET | Refills: 0 | Status: SHIPPED | OUTPATIENT
Start: 2020-12-21 | End: 2021-03-23

## 2020-12-21 RX ORDER — CITALOPRAM 20 MG/1
TABLET ORAL
Qty: 90 TABLET | Refills: 0 | Status: SHIPPED | OUTPATIENT
Start: 2020-12-21 | End: 2021-03-23

## 2021-01-01 ENCOUNTER — EXTERNAL RECORD (OUTPATIENT)
Dept: HEALTH INFORMATION MANAGEMENT | Facility: OTHER | Age: 76
End: 2021-01-01

## 2021-01-19 ENCOUNTER — LAB SERVICES (OUTPATIENT)
Dept: LAB | Age: 76
End: 2021-01-19

## 2021-01-19 DIAGNOSIS — E11.9 TYPE 2 DIABETES MELLITUS WITHOUT COMPLICATION, WITHOUT LONG-TERM CURRENT USE OF INSULIN (CMD): ICD-10-CM

## 2021-01-19 PROCEDURE — 36415 COLL VENOUS BLD VENIPUNCTURE: CPT | Performed by: CLINICAL MEDICAL LABORATORY

## 2021-01-19 PROCEDURE — 83036 HEMOGLOBIN GLYCOSYLATED A1C: CPT | Performed by: CLINICAL MEDICAL LABORATORY

## 2021-01-20 LAB — HBA1C MFR BLD: 6 % (ref 4.5–5.6)

## 2021-01-24 DIAGNOSIS — E11.9 TYPE 2 DIABETES MELLITUS WITHOUT COMPLICATION, WITHOUT LONG-TERM CURRENT USE OF INSULIN (CMD): Primary | ICD-10-CM

## 2021-01-25 ENCOUNTER — TELEPHONE (OUTPATIENT)
Dept: INTERNAL MEDICINE | Age: 76
End: 2021-01-25

## 2021-01-25 RX ORDER — LOVASTATIN 20 MG/1
TABLET ORAL
Qty: 180 TABLET | Refills: 0 | Status: SHIPPED | OUTPATIENT
Start: 2021-01-25 | End: 2021-03-23

## 2021-03-23 RX ORDER — HYDROCHLOROTHIAZIDE 12.5 MG/1
CAPSULE, GELATIN COATED ORAL
Qty: 90 CAPSULE | Refills: 0 | Status: SHIPPED | OUTPATIENT
Start: 2021-03-23 | End: 2021-06-26

## 2021-03-23 RX ORDER — QUINAPRIL 20 MG/1
TABLET ORAL
Qty: 90 TABLET | Refills: 0 | Status: SHIPPED | OUTPATIENT
Start: 2021-03-23 | End: 2021-06-26

## 2021-03-23 RX ORDER — LOVASTATIN 20 MG/1
TABLET ORAL
Qty: 180 TABLET | Refills: 0 | Status: SHIPPED | OUTPATIENT
Start: 2021-03-23 | End: 2021-07-05

## 2021-03-23 RX ORDER — CITALOPRAM 20 MG/1
TABLET ORAL
Qty: 90 TABLET | Refills: 0 | Status: SHIPPED | OUTPATIENT
Start: 2021-03-23 | End: 2021-07-05

## 2021-03-23 RX ORDER — METFORMIN HYDROCHLORIDE 500 MG/1
TABLET, EXTENDED RELEASE ORAL
Qty: 90 TABLET | Refills: 0 | Status: SHIPPED | OUTPATIENT
Start: 2021-03-23 | End: 2021-06-23

## 2021-05-11 ENCOUNTER — LAB SERVICES (OUTPATIENT)
Dept: LAB | Age: 76
End: 2021-05-11

## 2021-05-11 DIAGNOSIS — E11.9 TYPE 2 DIABETES MELLITUS WITHOUT COMPLICATION, WITHOUT LONG-TERM CURRENT USE OF INSULIN (CMD): ICD-10-CM

## 2021-05-11 LAB — HBA1C MFR BLD: 6 % (ref 4.5–5.6)

## 2021-05-11 PROCEDURE — 36415 COLL VENOUS BLD VENIPUNCTURE: CPT | Performed by: CLINICAL MEDICAL LABORATORY

## 2021-05-11 PROCEDURE — 83036 HEMOGLOBIN GLYCOSYLATED A1C: CPT | Performed by: CLINICAL MEDICAL LABORATORY

## 2021-05-13 DIAGNOSIS — I10 ESSENTIAL HYPERTENSION: Primary | ICD-10-CM

## 2021-05-13 DIAGNOSIS — E11.9 TYPE 2 DIABETES MELLITUS WITHOUT COMPLICATION, WITHOUT LONG-TERM CURRENT USE OF INSULIN (CMD): ICD-10-CM

## 2021-05-13 DIAGNOSIS — E78.00 HYPERCHOLESTEROLEMIA: ICD-10-CM

## 2021-05-26 VITALS
HEIGHT: 65 IN | WEIGHT: 217 LBS | HEART RATE: 90 BPM | HEART RATE: 88 BPM | RESPIRATION RATE: 16 BRPM | OXYGEN SATURATION: 96 % | BODY MASS INDEX: 36.65 KG/M2 | TEMPERATURE: 98.6 F | SYSTOLIC BLOOD PRESSURE: 132 MMHG | OXYGEN SATURATION: 99 % | SYSTOLIC BLOOD PRESSURE: 110 MMHG | TEMPERATURE: 99.3 F | WEIGHT: 220 LBS | RESPIRATION RATE: 16 BRPM | DIASTOLIC BLOOD PRESSURE: 78 MMHG | BODY MASS INDEX: 36.11 KG/M2 | DIASTOLIC BLOOD PRESSURE: 62 MMHG

## 2021-06-23 RX ORDER — METFORMIN HYDROCHLORIDE 500 MG/1
TABLET, EXTENDED RELEASE ORAL
Qty: 90 TABLET | Refills: 0 | Status: SHIPPED | OUTPATIENT
Start: 2021-06-23 | End: 2021-09-27 | Stop reason: SDUPTHER

## 2021-06-26 RX ORDER — QUINAPRIL 20 MG/1
TABLET ORAL
Qty: 90 TABLET | Refills: 3 | Status: SHIPPED | OUTPATIENT
Start: 2021-06-26 | End: 2022-01-13 | Stop reason: SDUPTHER

## 2021-06-26 RX ORDER — HYDROCHLOROTHIAZIDE 12.5 MG/1
CAPSULE, GELATIN COATED ORAL
Qty: 90 CAPSULE | Refills: 3 | Status: SHIPPED | OUTPATIENT
Start: 2021-06-26 | End: 2022-01-13 | Stop reason: SDUPTHER

## 2021-07-05 RX ORDER — CITALOPRAM 20 MG/1
TABLET ORAL
Qty: 90 TABLET | Refills: 3 | Status: SHIPPED | OUTPATIENT
Start: 2021-07-05 | End: 2022-01-13 | Stop reason: SDUPTHER

## 2021-07-05 RX ORDER — LOVASTATIN 20 MG/1
TABLET ORAL
Qty: 180 TABLET | Refills: 3 | Status: SHIPPED | OUTPATIENT
Start: 2021-07-05 | End: 2022-01-13 | Stop reason: SDUPTHER

## 2021-09-07 ENCOUNTER — TELEPHONE (OUTPATIENT)
Dept: INTERNAL MEDICINE | Age: 76
End: 2021-09-07

## 2021-09-17 ENCOUNTER — HOSPITAL ENCOUNTER (OUTPATIENT)
Dept: MAMMOGRAPHY | Age: 76
Discharge: HOME | End: 2021-09-17
Attending: INTERNAL MEDICINE

## 2021-09-17 DIAGNOSIS — Z12.31 ENCOUNTER FOR SCREENING MAMMOGRAM FOR MALIGNANT NEOPLASM OF BREAST: ICD-10-CM

## 2021-09-17 PROCEDURE — 77063 BREAST TOMOSYNTHESIS BI: CPT

## 2021-09-21 ENCOUNTER — LAB SERVICES (OUTPATIENT)
Dept: LAB | Age: 76
End: 2021-09-21

## 2021-09-21 DIAGNOSIS — E78.00 HYPERCHOLESTEROLEMIA: ICD-10-CM

## 2021-09-21 DIAGNOSIS — E11.9 TYPE 2 DIABETES MELLITUS WITHOUT COMPLICATION, WITHOUT LONG-TERM CURRENT USE OF INSULIN (CMD): ICD-10-CM

## 2021-09-21 DIAGNOSIS — I10 ESSENTIAL HYPERTENSION: ICD-10-CM

## 2021-09-21 LAB
ALBUMIN SERPL-MCNC: 3.9 G/DL (ref 3.6–5.1)
ALBUMIN/GLOB SERPL: 1.1 {RATIO} (ref 1–2.4)
ALP SERPL-CCNC: 62 UNITS/L (ref 45–117)
ALT SERPL-CCNC: 39 UNITS/L
ANION GAP SERPL CALC-SCNC: 9 MMOL/L (ref 10–20)
AST SERPL-CCNC: 22 UNITS/L
BASOPHILS # BLD: 0.1 K/MCL (ref 0–0.3)
BASOPHILS NFR BLD: 1 %
BILIRUB SERPL-MCNC: 0.5 MG/DL (ref 0.2–1)
BUN SERPL-MCNC: 22 MG/DL (ref 6–20)
BUN/CREAT SERPL: 22 (ref 7–25)
CALCIUM SERPL-MCNC: 8.8 MG/DL (ref 8.4–10.2)
CHLORIDE SERPL-SCNC: 104 MMOL/L (ref 98–107)
CHOLEST SERPL-MCNC: 159 MG/DL
CHOLEST/HDLC SERPL: 2.2 {RATIO}
CO2 SERPL-SCNC: 30 MMOL/L (ref 21–32)
CREAT SERPL-MCNC: 0.98 MG/DL (ref 0.51–0.95)
CREAT UR-MCNC: 56.1 MG/DL
DEPRECATED RDW RBC: 48.5 FL (ref 39–50)
EOSINOPHIL # BLD: 0.2 K/MCL (ref 0–0.5)
EOSINOPHIL NFR BLD: 2 %
ERYTHROCYTE [DISTWIDTH] IN BLOOD: 13.7 % (ref 11–15)
FASTING DURATION TIME PATIENT: 12 HOURS (ref 0–999)
FASTING DURATION TIME PATIENT: 12 HOURS (ref 0–999)
GFR SERPLBLD BASED ON 1.73 SQ M-ARVRAT: 57 ML/MIN
GLOBULIN SER-MCNC: 3.6 G/DL (ref 2–4)
GLUCOSE SERPL-MCNC: 91 MG/DL (ref 65–99)
HBA1C MFR BLD: 6.1 % (ref 4.5–5.6)
HCT VFR BLD CALC: 41.1 % (ref 36–46.5)
HDLC SERPL-MCNC: 73 MG/DL
HGB BLD-MCNC: 13.2 G/DL (ref 12–15.5)
IMM GRANULOCYTES # BLD AUTO: 0.1 K/MCL (ref 0–0.2)
IMM GRANULOCYTES # BLD: 1 %
LDLC SERPL CALC-MCNC: 64 MG/DL
LYMPHOCYTES # BLD: 2.1 K/MCL (ref 1–4)
LYMPHOCYTES NFR BLD: 25 %
MCH RBC QN AUTO: 30.4 PG (ref 26–34)
MCHC RBC AUTO-ENTMCNC: 32.1 G/DL (ref 32–36.5)
MCV RBC AUTO: 94.7 FL (ref 78–100)
MICROALBUMIN UR-MCNC: <0.5 MG/DL
MICROALBUMIN/CREAT UR: NORMAL MG/G{CREAT}
MONOCYTES # BLD: 0.8 K/MCL (ref 0.3–0.9)
MONOCYTES NFR BLD: 10 %
NEUTROPHILS # BLD: 5 K/MCL (ref 1.8–7.7)
NEUTROPHILS NFR BLD: 61 %
NONHDLC SERPL-MCNC: 86 MG/DL
NRBC BLD MANUAL-RTO: 0 /100 WBC
PLATELET # BLD AUTO: 202 K/MCL (ref 140–450)
POTASSIUM SERPL-SCNC: 3.9 MMOL/L (ref 3.4–5.1)
PROT SERPL-MCNC: 7.5 G/DL (ref 6.4–8.2)
RBC # BLD: 4.34 MIL/MCL (ref 4–5.2)
SODIUM SERPL-SCNC: 139 MMOL/L (ref 135–145)
TRIGL SERPL-MCNC: 112 MG/DL
TSH SERPL-ACNC: 3.22 MCUNITS/ML (ref 0.35–5)
WBC # BLD: 8.3 K/MCL (ref 4.2–11)

## 2021-09-21 PROCEDURE — 82570 ASSAY OF URINE CREATININE: CPT | Performed by: CLINICAL MEDICAL LABORATORY

## 2021-09-21 PROCEDURE — 85025 COMPLETE CBC W/AUTO DIFF WBC: CPT | Performed by: CLINICAL MEDICAL LABORATORY

## 2021-09-21 PROCEDURE — 84443 ASSAY THYROID STIM HORMONE: CPT | Performed by: CLINICAL MEDICAL LABORATORY

## 2021-09-21 PROCEDURE — 83036 HEMOGLOBIN GLYCOSYLATED A1C: CPT | Performed by: CLINICAL MEDICAL LABORATORY

## 2021-09-21 PROCEDURE — 36415 COLL VENOUS BLD VENIPUNCTURE: CPT | Performed by: CLINICAL MEDICAL LABORATORY

## 2021-09-21 PROCEDURE — 80053 COMPREHEN METABOLIC PANEL: CPT | Performed by: CLINICAL MEDICAL LABORATORY

## 2021-09-21 PROCEDURE — 82043 UR ALBUMIN QUANTITATIVE: CPT | Performed by: CLINICAL MEDICAL LABORATORY

## 2021-09-21 PROCEDURE — 80061 LIPID PANEL: CPT | Performed by: CLINICAL MEDICAL LABORATORY

## 2021-09-27 ENCOUNTER — OFFICE VISIT (OUTPATIENT)
Dept: INTERNAL MEDICINE | Age: 76
End: 2021-09-27

## 2021-09-27 VITALS
HEART RATE: 85 BPM | BODY MASS INDEX: 37.42 KG/M2 | DIASTOLIC BLOOD PRESSURE: 80 MMHG | OXYGEN SATURATION: 96 % | SYSTOLIC BLOOD PRESSURE: 120 MMHG | HEIGHT: 65 IN | TEMPERATURE: 99.7 F | WEIGHT: 224.6 LBS

## 2021-09-27 DIAGNOSIS — K21.9 GASTROESOPHAGEAL REFLUX DISEASE WITHOUT ESOPHAGITIS: ICD-10-CM

## 2021-09-27 DIAGNOSIS — Z11.59 NEED FOR HEPATITIS C SCREENING TEST: ICD-10-CM

## 2021-09-27 DIAGNOSIS — E11.9 TYPE 2 DIABETES MELLITUS WITHOUT COMPLICATION, WITHOUT LONG-TERM CURRENT USE OF INSULIN (CMD): Primary | ICD-10-CM

## 2021-09-27 DIAGNOSIS — E66.01 TYPE 2 DIABETES MELLITUS WITH MORBID OBESITY (CMD): ICD-10-CM

## 2021-09-27 DIAGNOSIS — I10 ESSENTIAL HYPERTENSION: ICD-10-CM

## 2021-09-27 DIAGNOSIS — E78.00 HYPERCHOLESTEROLEMIA: ICD-10-CM

## 2021-09-27 DIAGNOSIS — E11.69 TYPE 2 DIABETES MELLITUS WITH MORBID OBESITY (CMD): ICD-10-CM

## 2021-09-27 DIAGNOSIS — N95.2 ATROPHIC VAGINITIS: ICD-10-CM

## 2021-09-27 PROCEDURE — 99214 OFFICE O/P EST MOD 30 MIN: CPT | Performed by: INTERNAL MEDICINE

## 2021-09-27 RX ORDER — ESTRADIOL 10 UG/1
10 INSERT VAGINAL
Qty: 4 TABLET | Refills: 11 | Status: SHIPPED | OUTPATIENT
Start: 2021-09-27 | End: 2023-01-15 | Stop reason: SDUPTHER

## 2021-09-27 RX ORDER — PANTOPRAZOLE SODIUM 40 MG/1
40 TABLET, DELAYED RELEASE ORAL DAILY
Qty: 90 TABLET | Refills: 3 | Status: SHIPPED | OUTPATIENT
Start: 2021-09-27 | End: 2022-01-13 | Stop reason: SDUPTHER

## 2021-09-27 RX ORDER — METFORMIN HYDROCHLORIDE 500 MG/1
500 TABLET, EXTENDED RELEASE ORAL
Qty: 90 TABLET | Refills: 3 | Status: SHIPPED | OUTPATIENT
Start: 2021-09-27 | End: 2022-01-13 | Stop reason: SDUPTHER

## 2021-09-27 ASSESSMENT — COGNITIVE AND FUNCTIONAL STATUS - GENERAL
DO YOU HAVE DIFFICULTY DRESSING OR BATHING: NO
BECAUSE OF A PHYSICAL, MENTAL, OR EMOTIONAL CONDITION, DO YOU HAVE DIFFICULTY DOING ERRANDS ALONE: NO
DO YOU HAVE SERIOUS DIFFICULTY WALKING OR CLIMBING STAIRS: NO
BECAUSE OF A PHYSICAL, MENTAL, OR EMOTIONAL CONDITION, DO YOU HAVE SERIOUS DIFFICULTY CONCENTRATING, REMEMBERING OR MAKING DECISIONS: NO

## 2021-09-27 ASSESSMENT — ENCOUNTER SYMPTOMS
HEMATOLOGIC/LYMPHATIC NEGATIVE: 1
ALLERGIC/IMMUNOLOGIC NEGATIVE: 1
RESPIRATORY NEGATIVE: 1
GASTROINTESTINAL NEGATIVE: 1
NEUROLOGICAL NEGATIVE: 1
CONSTITUTIONAL NEGATIVE: 1
PSYCHIATRIC NEGATIVE: 1
EYES NEGATIVE: 1
ENDOCRINE NEGATIVE: 1

## 2021-09-27 ASSESSMENT — PATIENT HEALTH QUESTIONNAIRE - PHQ9
SUM OF ALL RESPONSES TO PHQ9 QUESTIONS 1 AND 2: 0
2. FEELING DOWN, DEPRESSED OR HOPELESS: NOT AT ALL
CLINICAL INTERPRETATION OF PHQ9 SCORE: NO FURTHER SCREENING NEEDED
CLINICAL INTERPRETATION OF PHQ2 SCORE: NO FURTHER SCREENING NEEDED
1. LITTLE INTEREST OR PLEASURE IN DOING THINGS: NOT AT ALL
SUM OF ALL RESPONSES TO PHQ9 QUESTIONS 1 AND 2: 0

## 2021-09-29 ENCOUNTER — HOSPITAL ENCOUNTER (OUTPATIENT)
Age: 76
Discharge: HOME | End: 2021-09-29
Payer: MEDICARE

## 2021-09-29 VITALS
DIASTOLIC BLOOD PRESSURE: 72 MMHG | BODY MASS INDEX: 36.65 KG/M2 | TEMPERATURE: 101 F | RESPIRATION RATE: 18 BRPM | HEART RATE: 98 BPM | WEIGHT: 220 LBS | HEIGHT: 65 IN | OXYGEN SATURATION: 95 % | SYSTOLIC BLOOD PRESSURE: 165 MMHG

## 2021-09-29 DIAGNOSIS — Z20.822 LAB TEST NEGATIVE FOR COVID-19 VIRUS: Primary | ICD-10-CM

## 2021-09-29 DIAGNOSIS — B34.9 VIRAL SYNDROME: ICD-10-CM

## 2021-09-29 LAB — SARS-COV-2 RNA RESP QL NAA+PROBE: NOT DETECTED

## 2021-09-29 PROCEDURE — 99213 OFFICE O/P EST LOW 20 MIN: CPT

## 2021-09-29 RX ORDER — ACETAMINOPHEN 500 MG
1000 TABLET ORAL ONCE
Status: COMPLETED | OUTPATIENT
Start: 2021-09-29 | End: 2021-09-29

## 2021-09-29 NOTE — ED PROVIDER NOTES
Patient Seen in: [facility]      History   Patient presents with:   Body ache and/or chills  Fever    Stated Complaint: covid testing    Subjective:   HPI    29-year-old female with known history of hyperlipidemia and hypertension who come trismus or drooling no phonation changes, patient handling secretions well   Lungs: Clear to auscultation bilaterally, respirations unlabored. No audible wheezing, rales or rhonchi.   Heart: Regular rate and rhythm, no murmurs      ED Course     Labs Review

## 2021-09-29 NOTE — ED INITIAL ASSESSMENT (HPI)
Patient presents to IC with c/o chills,fever x 2 days. No cough noted. +FULLY vaccinated(6 months out from [facility] at [facility].

## 2021-10-06 ENCOUNTER — TELEPHONE (OUTPATIENT)
Dept: SCHEDULING | Age: 76
End: 2021-10-06

## 2021-10-07 RX ORDER — AZITHROMYCIN 500 MG/1
500 TABLET, FILM COATED ORAL DAILY
Qty: 3 TABLET | Refills: 0 | Status: SHIPPED | OUTPATIENT
Start: 2021-10-07 | End: 2022-08-15 | Stop reason: CLARIF

## 2021-10-11 ENCOUNTER — TELEPHONE (OUTPATIENT)
Dept: SCHEDULING | Age: 76
End: 2021-10-11

## 2021-10-18 ENCOUNTER — HOSPITAL ENCOUNTER (OUTPATIENT)
Age: 76
Discharge: HOME | End: 2021-10-18
Payer: MEDICARE

## 2021-10-18 VITALS
DIASTOLIC BLOOD PRESSURE: 78 MMHG | SYSTOLIC BLOOD PRESSURE: 135 MMHG | TEMPERATURE: 100 F | WEIGHT: 220 LBS | HEART RATE: 114 BPM | RESPIRATION RATE: 20 BRPM | HEIGHT: 66 IN | OXYGEN SATURATION: 95 % | BODY MASS INDEX: 35.36 KG/M2

## 2021-10-18 DIAGNOSIS — R05.3 PERSISTENT COUGH: ICD-10-CM

## 2021-10-18 DIAGNOSIS — J01.90 ACUTE SINUSITIS, RECURRENCE NOT SPECIFIED, UNSPECIFIED LOCATION: Primary | ICD-10-CM

## 2021-10-18 PROCEDURE — 99213 OFFICE O/P EST LOW 20 MIN: CPT

## 2021-10-18 RX ORDER — DOXYCYCLINE HYCLATE 100 MG/1
100 CAPSULE ORAL 2 TIMES DAILY
Qty: 20 CAPSULE | Refills: 0 | Status: SHIPPED | OUTPATIENT
Start: 2021-10-18 | End: 2021-10-28

## 2021-10-18 RX ORDER — CETIRIZINE HYDROCHLORIDE 10 MG/1
10 TABLET ORAL DAILY
Qty: 30 TABLET | Refills: 0 | Status: SHIPPED | OUTPATIENT
Start: 2021-10-18 | End: 2021-11-17

## 2021-10-18 RX ORDER — DEXAMETHASONE 4 MG/1
16 TABLET ORAL ONCE
Status: COMPLETED | OUTPATIENT
Start: 2021-10-18 | End: 2021-10-18

## 2021-10-18 NOTE — ED PROVIDER NOTES
Patient Seen in: [facility]      History   Patient presents with:  Runny Nose  Cough    Stated Complaint: sinus infection    Subjective:   HPI    17-year-old female here with complaint of sinus pain and pressure with purulent drainage and Exam  Vitals and nursing note reviewed. Constitutional:       Appearance: Normal appearance. She is well-developed. HENT:      Head: Normocephalic. Jaw: There is normal jaw occlusion.       Right Ear: External ear normal.      Left Ear: External ea instructions are given and discussed. Discharge medications are discussed. The patient is in good condition throughout the visit today and remains so upon discharge. I discuss the plan of care with the patient, who expresses understanding.  All questions an

## 2021-10-18 NOTE — ED INITIAL ASSESSMENT (HPI)
Patient here for evaluation of a runny nose that started Sunday, post nasal drip with cough, and today started with a fever - 100.5 this morning around 8am. She took tylenol 500mg around 8am. Denies any N/V/D, loss of taste or smell, SOB/MANOLO/CP, ear or thr

## 2021-11-16 ENCOUNTER — E-ADVICE (OUTPATIENT)
Dept: INTERNAL MEDICINE | Age: 76
End: 2021-11-16

## 2021-12-21 ENCOUNTER — LAB SERVICES (OUTPATIENT)
Dept: LAB | Age: 76
End: 2021-12-21

## 2021-12-21 DIAGNOSIS — Z11.59 NEED FOR HEPATITIS C SCREENING TEST: ICD-10-CM

## 2021-12-21 DIAGNOSIS — E11.9 TYPE 2 DIABETES MELLITUS WITHOUT COMPLICATION, WITHOUT LONG-TERM CURRENT USE OF INSULIN (CMD): ICD-10-CM

## 2021-12-21 LAB
HBA1C MFR BLD: 6.2 % (ref 4.5–5.6)
HCV AB SER QL: NEGATIVE

## 2021-12-21 PROCEDURE — G0472 HEP C SCREEN HIGH RISK/OTHER: HCPCS | Performed by: CLINICAL MEDICAL LABORATORY

## 2021-12-21 PROCEDURE — 83036 HEMOGLOBIN GLYCOSYLATED A1C: CPT | Performed by: CLINICAL MEDICAL LABORATORY

## 2021-12-21 PROCEDURE — 36415 COLL VENOUS BLD VENIPUNCTURE: CPT | Performed by: CLINICAL MEDICAL LABORATORY

## 2022-01-13 ENCOUNTER — TELEPHONE (OUTPATIENT)
Dept: SCHEDULING | Age: 77
End: 2022-01-13

## 2022-01-13 RX ORDER — PANTOPRAZOLE SODIUM 40 MG/1
40 TABLET, DELAYED RELEASE ORAL DAILY
Qty: 90 TABLET | Refills: 3 | Status: SHIPPED | OUTPATIENT
Start: 2022-01-13 | End: 2022-06-06 | Stop reason: SDUPTHER

## 2022-01-13 RX ORDER — QUINAPRIL 20 MG/1
20 TABLET ORAL DAILY
Qty: 90 TABLET | Refills: 3 | Status: SHIPPED | OUTPATIENT
Start: 2022-01-13 | End: 2022-11-17

## 2022-01-13 RX ORDER — METFORMIN HYDROCHLORIDE 500 MG/1
500 TABLET, EXTENDED RELEASE ORAL
Qty: 90 TABLET | Refills: 3 | Status: SHIPPED | OUTPATIENT
Start: 2022-01-13 | End: 2022-11-10

## 2022-01-13 RX ORDER — HYDROCHLOROTHIAZIDE 12.5 MG/1
12.5 CAPSULE, GELATIN COATED ORAL DAILY
Qty: 90 CAPSULE | Refills: 3 | Status: SHIPPED | OUTPATIENT
Start: 2022-01-13 | End: 2023-01-29

## 2022-01-13 RX ORDER — LOVASTATIN 20 MG/1
40 TABLET ORAL DAILY
Qty: 180 TABLET | Refills: 3 | Status: SHIPPED | OUTPATIENT
Start: 2022-01-13 | End: 2022-11-17

## 2022-01-13 RX ORDER — CITALOPRAM 20 MG/1
20 TABLET ORAL DAILY
Qty: 90 TABLET | Refills: 3 | Status: SHIPPED | OUTPATIENT
Start: 2022-01-13 | End: 2023-01-29

## 2022-01-18 RX ORDER — DOXYCYCLINE HYCLATE 100 MG/1
CAPSULE ORAL
COMMUNITY
Start: 2021-10-18 | End: 2022-08-26 | Stop reason: ALTCHOICE

## 2022-01-18 RX ORDER — CETIRIZINE HYDROCHLORIDE 10 MG/1
TABLET ORAL
COMMUNITY
Start: 2021-10-18 | End: 2023-09-25 | Stop reason: ALTCHOICE

## 2022-01-19 ENCOUNTER — OFFICE VISIT (OUTPATIENT)
Dept: INTERNAL MEDICINE | Age: 77
End: 2022-01-19

## 2022-01-19 ENCOUNTER — HOSPITAL ENCOUNTER (OUTPATIENT)
Dept: GENERAL RADIOLOGY | Age: 77
Discharge: HOME | End: 2022-01-19

## 2022-01-19 VITALS
OXYGEN SATURATION: 96 % | HEIGHT: 65 IN | DIASTOLIC BLOOD PRESSURE: 80 MMHG | WEIGHT: 224.21 LBS | BODY MASS INDEX: 37.36 KG/M2 | HEART RATE: 80 BPM | SYSTOLIC BLOOD PRESSURE: 120 MMHG

## 2022-01-19 DIAGNOSIS — M25.551 CHRONIC RIGHT HIP PAIN: Primary | ICD-10-CM

## 2022-01-19 DIAGNOSIS — G89.29 CHRONIC RIGHT HIP PAIN: Primary | ICD-10-CM

## 2022-01-19 DIAGNOSIS — M25.551 CHRONIC RIGHT HIP PAIN: ICD-10-CM

## 2022-01-19 DIAGNOSIS — G89.29 CHRONIC RIGHT HIP PAIN: ICD-10-CM

## 2022-01-19 PROCEDURE — 3074F SYST BP LT 130 MM HG: CPT | Performed by: INTERNAL MEDICINE

## 2022-01-19 PROCEDURE — 99213 OFFICE O/P EST LOW 20 MIN: CPT | Performed by: INTERNAL MEDICINE

## 2022-01-19 PROCEDURE — 3079F DIAST BP 80-89 MM HG: CPT | Performed by: INTERNAL MEDICINE

## 2022-01-19 PROCEDURE — 73502 X-RAY EXAM HIP UNI 2-3 VIEWS: CPT

## 2022-01-19 ASSESSMENT — ENCOUNTER SYMPTOMS
RESPIRATORY NEGATIVE: 1
EYES NEGATIVE: 1
PSYCHIATRIC NEGATIVE: 1
NEUROLOGICAL NEGATIVE: 1
ALLERGIC/IMMUNOLOGIC NEGATIVE: 1
HEMATOLOGIC/LYMPHATIC NEGATIVE: 1
ENDOCRINE NEGATIVE: 1
CONSTITUTIONAL NEGATIVE: 1
GASTROINTESTINAL NEGATIVE: 1

## 2022-01-19 ASSESSMENT — PATIENT HEALTH QUESTIONNAIRE - PHQ9
2. FEELING DOWN, DEPRESSED OR HOPELESS: NOT AT ALL
SUM OF ALL RESPONSES TO PHQ9 QUESTIONS 1 AND 2: 0
CLINICAL INTERPRETATION OF PHQ2 SCORE: NO FURTHER SCREENING NEEDED
SUM OF ALL RESPONSES TO PHQ9 QUESTIONS 1 AND 2: 0
1. LITTLE INTEREST OR PLEASURE IN DOING THINGS: NOT AT ALL

## 2022-01-19 ASSESSMENT — COGNITIVE AND FUNCTIONAL STATUS - GENERAL
DO YOU HAVE DIFFICULTY DRESSING OR BATHING: NO
DO YOU HAVE SERIOUS DIFFICULTY WALKING OR CLIMBING STAIRS: NO
BECAUSE OF A PHYSICAL, MENTAL, OR EMOTIONAL CONDITION, DO YOU HAVE DIFFICULTY DOING ERRANDS ALONE: NO
BECAUSE OF A PHYSICAL, MENTAL, OR EMOTIONAL CONDITION, DO YOU HAVE SERIOUS DIFFICULTY CONCENTRATING, REMEMBERING OR MAKING DECISIONS: NO

## 2022-02-18 ENCOUNTER — HOSPITAL ENCOUNTER (OUTPATIENT)
Age: 77
Discharge: HOME | End: 2022-02-18
Payer: MEDICARE

## 2022-02-18 ENCOUNTER — APPOINTMENT (OUTPATIENT)
Dept: GENERAL RADIOLOGY | Age: 77
End: 2022-02-18
Attending: NURSE PRACTITIONER
Payer: MEDICARE

## 2022-02-18 VITALS
RESPIRATION RATE: 16 BRPM | WEIGHT: 224 LBS | HEART RATE: 75 BPM | HEIGHT: 65 IN | SYSTOLIC BLOOD PRESSURE: 161 MMHG | DIASTOLIC BLOOD PRESSURE: 78 MMHG | BODY MASS INDEX: 37.32 KG/M2 | OXYGEN SATURATION: 98 % | TEMPERATURE: 98 F

## 2022-02-18 DIAGNOSIS — M79.675 PAIN OF LEFT GREAT TOE: Primary | ICD-10-CM

## 2022-02-18 LAB
#MXD IC: 0.4 X10ˆ3/UL (ref 0.1–1)
BASOPHILS # BLD AUTO: 0.07 X10(3) UL (ref 0–0.2)
BASOPHILS NFR BLD AUTO: 0.7 %
BUN BLD-MCNC: 29 MG/DL (ref 7–18)
CHLORIDE BLD-SCNC: 105 MMOL/L (ref 98–112)
CO2 BLD-SCNC: 25 MMOL/L (ref 21–32)
CREAT BLD-MCNC: 0.9 MG/DL
EOSINOPHIL # BLD AUTO: 0.26 X10(3) UL (ref 0–0.7)
EOSINOPHIL NFR BLD AUTO: 2.7 %
ERYTHROCYTE [DISTWIDTH] IN BLOOD BY AUTOMATED COUNT: 13.8 %
GLUCOSE BLD-MCNC: 147 MG/DL (ref 70–99)
HCT VFR BLD AUTO: 41.7 %
HCT VFR BLD AUTO: 42.9 %
HCT VFR BLD CALC: 42 %
HGB BLD-MCNC: 13.6 G/DL
HGB BLD-MCNC: 14.1 G/DL
IMM GRANULOCYTES # BLD AUTO: 0.09 X10(3) UL (ref 0–1)
IMM GRANULOCYTES NFR BLD: 0.9 %
ISTAT IONIZED CALCIUM FOR CHEM 8: 1.03 MMOL/L (ref 1.12–1.32)
LYMPHOCYTES # BLD AUTO: 2.4 X10ˆ3/UL (ref 1–4)
LYMPHOCYTES # BLD AUTO: 2.58 X10(3) UL (ref 1–4)
LYMPHOCYTES NFR BLD AUTO: 24.4 %
LYMPHOCYTES NFR BLD AUTO: 26.8 %
MCH RBC QN AUTO: 30.1 PG (ref 26–34)
MCH RBC QN AUTO: 30.7 PG (ref 26–34)
MCHC RBC AUTO-ENTMCNC: 31.7 G/DL (ref 31–37)
MCHC RBC AUTO-ENTMCNC: 33.8 G/DL (ref 31–37)
MCV RBC AUTO: 90.8 FL
MCV RBC AUTO: 94.9 FL (ref 80–100)
MIXED CELL %: 4.5 %
MONOCYTES NFR BLD AUTO: 6.6 %
NEUTROPHILS # BLD AUTO: 6 X10 (3) UL (ref 1.5–7.7)
NEUTROPHILS # BLD AUTO: 6.9 X10ˆ3/UL (ref 1.5–7.7)
NEUTROPHILS NFR BLD AUTO: 62.3 %
NEUTROPHILS NFR BLD AUTO: 71.1 %
PLATELET # BLD AUTO: 205 10(3)UL (ref 150–450)
POTASSIUM BLD-SCNC: 4.7 MMOL/L (ref 3.6–5.1)
RBC # BLD AUTO: 4.52 X10ˆ6/UL
RBC # BLD AUTO: 4.59 X10(6)UL
SODIUM BLD-SCNC: 139 MMOL/L (ref 136–145)
URATE SERPL-MCNC: 7.8 MG/DL
WBC # BLD AUTO: 9.6 X10(3) UL (ref 4–11)
WBC # BLD AUTO: 9.7 X10ˆ3/UL (ref 4–11)

## 2022-02-18 PROCEDURE — 36415 COLL VENOUS BLD VENIPUNCTURE: CPT

## 2022-02-18 PROCEDURE — 84550 ASSAY OF BLOOD/URIC ACID: CPT | Performed by: NURSE PRACTITIONER

## 2022-02-18 PROCEDURE — 85025 COMPLETE CBC W/AUTO DIFF WBC: CPT | Performed by: NURSE PRACTITIONER

## 2022-02-18 PROCEDURE — 99214 OFFICE O/P EST MOD 30 MIN: CPT

## 2022-02-18 PROCEDURE — 80047 BASIC METABLC PNL IONIZED CA: CPT

## 2022-02-18 PROCEDURE — 73630 X-RAY EXAM OF FOOT: CPT | Performed by: NURSE PRACTITIONER

## 2022-02-18 PROCEDURE — 99213 OFFICE O/P EST LOW 20 MIN: CPT

## 2022-02-18 RX ORDER — PREDNISONE 20 MG/1
40 TABLET ORAL DAILY
Qty: 10 TABLET | Refills: 0 | Status: SHIPPED | OUTPATIENT
Start: 2022-02-18 | End: 2022-02-23

## 2022-02-18 NOTE — ED INITIAL ASSESSMENT (HPI)
Patient reports left big toe pain x 2 days, tender to touch to medial aspect at joint. Denies any swelling. No injury. Concerned she may have gout. No redness observed. Able to weight bear but pain present when trying to bend.

## 2022-03-09 ENCOUNTER — TELEPHONE (OUTPATIENT)
Dept: ORTHOPEDICS CLINIC | Facility: CLINIC | Age: 77
End: 2022-03-09

## 2022-03-09 NOTE — TELEPHONE ENCOUNTER
Reviewed patients chart, xray orders are required. Order placed for rt hip xrays.  Please contact patient advise to arrive 30 mins prior to patients appt to complete x-ray order and schedule patients xray appt-Thank you

## 2022-04-01 ENCOUNTER — TELEPHONE (OUTPATIENT)
Dept: INTERNAL MEDICINE | Age: 77
End: 2022-04-01

## 2022-04-01 ENCOUNTER — TELEPHONE (OUTPATIENT)
Dept: SCHEDULING | Age: 77
End: 2022-04-01

## 2022-04-01 ENCOUNTER — LAB SERVICES (OUTPATIENT)
Dept: LAB | Age: 77
End: 2022-04-01

## 2022-04-01 DIAGNOSIS — E11.9 TYPE 2 DIABETES MELLITUS WITHOUT COMPLICATION, WITHOUT LONG-TERM CURRENT USE OF INSULIN (CMD): ICD-10-CM

## 2022-04-01 DIAGNOSIS — E11.9 TYPE 2 DIABETES MELLITUS WITHOUT COMPLICATION, WITHOUT LONG-TERM CURRENT USE OF INSULIN (CMD): Primary | ICD-10-CM

## 2022-04-01 LAB — HBA1C MFR BLD: 6.1 % (ref 4.5–5.6)

## 2022-04-01 PROCEDURE — 36415 COLL VENOUS BLD VENIPUNCTURE: CPT | Performed by: INTERNAL MEDICINE

## 2022-04-01 PROCEDURE — 83036 HEMOGLOBIN GLYCOSYLATED A1C: CPT | Performed by: INTERNAL MEDICINE

## 2022-04-11 ENCOUNTER — EXTERNAL RECORD (OUTPATIENT)
Dept: HEALTH INFORMATION MANAGEMENT | Facility: OTHER | Age: 77
End: 2022-04-11

## 2022-04-11 ENCOUNTER — HOSPITAL ENCOUNTER (OUTPATIENT)
Dept: GENERAL RADIOLOGY | Age: 77
Discharge: HOME | End: 2022-04-11
Attending: ORTHOPAEDIC SURGERY
Payer: MEDICARE

## 2022-04-11 ENCOUNTER — OFFICE VISIT (OUTPATIENT)
Dept: ORTHOPEDICS CLINIC | Facility: CLINIC | Age: 77
End: 2022-04-11
Payer: MEDICARE

## 2022-04-11 VITALS — OXYGEN SATURATION: 98 % | BODY MASS INDEX: 36.84 KG/M2 | HEIGHT: 65.25 IN | HEART RATE: 83 BPM | WEIGHT: 223.81 LBS

## 2022-04-11 DIAGNOSIS — M25.551 HIP PAIN, RIGHT: Primary | ICD-10-CM

## 2022-04-11 DIAGNOSIS — M16.11 PRIMARY OSTEOARTHRITIS OF RIGHT HIP: ICD-10-CM

## 2022-04-11 DIAGNOSIS — M76.891 TENDINITIS OF RIGHT HIP: ICD-10-CM

## 2022-04-11 DIAGNOSIS — M25.551 RIGHT HIP PAIN: ICD-10-CM

## 2022-04-11 PROCEDURE — 3008F BODY MASS INDEX DOCD: CPT | Performed by: ORTHOPAEDIC SURGERY

## 2022-04-11 PROCEDURE — 99203 OFFICE O/P NEW LOW 30 MIN: CPT | Performed by: ORTHOPAEDIC SURGERY

## 2022-04-11 PROCEDURE — 73502 X-RAY EXAM HIP UNI 2-3 VIEWS: CPT | Performed by: ORTHOPAEDIC SURGERY

## 2022-05-01 ENCOUNTER — MED REC SCAN ONLY (OUTPATIENT)
Dept: ORTHOPEDICS CLINIC | Facility: CLINIC | Age: 77
End: 2022-05-01

## 2022-05-20 ENCOUNTER — CLINICAL ABSTRACT (OUTPATIENT)
Dept: INTERNAL MEDICINE | Age: 77
End: 2022-05-20

## 2022-06-01 ENCOUNTER — MED REC SCAN ONLY (OUTPATIENT)
Dept: ORTHOPEDICS CLINIC | Facility: CLINIC | Age: 77
End: 2022-06-01

## 2022-06-06 RX ORDER — PANTOPRAZOLE SODIUM 40 MG/1
40 TABLET, DELAYED RELEASE ORAL DAILY
Qty: 90 TABLET | Refills: 3 | Status: SHIPPED | OUTPATIENT
Start: 2022-06-06 | End: 2023-01-29

## 2022-07-05 ENCOUNTER — LAB SERVICES (OUTPATIENT)
Dept: LAB | Age: 77
End: 2022-07-05

## 2022-07-05 ENCOUNTER — E-ADVICE (OUTPATIENT)
Dept: INTERNAL MEDICINE | Age: 77
End: 2022-07-05

## 2022-07-05 DIAGNOSIS — E78.00 HYPERCHOLESTEROLEMIA: Primary | ICD-10-CM

## 2022-07-05 DIAGNOSIS — E11.9 TYPE 2 DIABETES MELLITUS WITHOUT COMPLICATION, WITHOUT LONG-TERM CURRENT USE OF INSULIN (CMD): ICD-10-CM

## 2022-07-05 DIAGNOSIS — I10 ESSENTIAL HYPERTENSION: ICD-10-CM

## 2022-07-05 LAB — HBA1C MFR BLD: 6.1 % (ref 4.5–5.6)

## 2022-07-05 PROCEDURE — 83036 HEMOGLOBIN GLYCOSYLATED A1C: CPT | Performed by: INTERNAL MEDICINE

## 2022-07-05 PROCEDURE — 36415 COLL VENOUS BLD VENIPUNCTURE: CPT | Performed by: INTERNAL MEDICINE

## 2022-07-07 DIAGNOSIS — E11.9 TYPE 2 DIABETES MELLITUS WITHOUT COMPLICATION, WITHOUT LONG-TERM CURRENT USE OF INSULIN (CMD): Primary | ICD-10-CM

## 2022-07-19 ENCOUNTER — HOSPITAL ENCOUNTER (OUTPATIENT)
Age: 77
Discharge: HOME | End: 2022-07-19
Payer: MEDICARE

## 2022-07-19 VITALS
DIASTOLIC BLOOD PRESSURE: 72 MMHG | SYSTOLIC BLOOD PRESSURE: 165 MMHG | HEART RATE: 100 BPM | HEIGHT: 66 IN | OXYGEN SATURATION: 95 % | RESPIRATION RATE: 18 BRPM | BODY MASS INDEX: 36 KG/M2 | TEMPERATURE: 100 F | WEIGHT: 224 LBS

## 2022-07-19 DIAGNOSIS — R05.9 COUGH: Primary | ICD-10-CM

## 2022-07-19 LAB — SARS-COV-2 RNA RESP QL NAA+PROBE: NOT DETECTED

## 2022-07-19 PROCEDURE — 99213 OFFICE O/P EST LOW 20 MIN: CPT

## 2022-07-19 RX ORDER — BENZONATATE 100 MG/1
100 CAPSULE ORAL 3 TIMES DAILY PRN
Qty: 30 CAPSULE | Refills: 0 | Status: SHIPPED | OUTPATIENT
Start: 2022-07-19 | End: 2022-08-18

## 2022-07-19 RX ORDER — ALBUTEROL SULFATE 90 UG/1
2 AEROSOL, METERED RESPIRATORY (INHALATION) EVERY 6 HOURS PRN
Qty: 1 EACH | Refills: 0 | Status: SHIPPED | OUTPATIENT
Start: 2022-07-19 | End: 2022-08-18

## 2022-07-20 NOTE — ED INITIAL ASSESSMENT (HPI)
C/o cough, congestion,  runny nose and headache since Sunday. Home kit Covid test negative this morning.

## 2022-07-25 ENCOUNTER — E-ADVICE (OUTPATIENT)
Dept: INTERNAL MEDICINE | Age: 77
End: 2022-07-25

## 2022-07-25 DIAGNOSIS — Z12.31 VISIT FOR SCREENING MAMMOGRAM: Primary | ICD-10-CM

## 2022-08-11 ENCOUNTER — HOSPITAL ENCOUNTER (OUTPATIENT)
Age: 77
Discharge: HOME | End: 2022-08-11
Attending: EMERGENCY MEDICINE
Payer: MEDICARE

## 2022-08-11 ENCOUNTER — APPOINTMENT (OUTPATIENT)
Dept: GENERAL RADIOLOGY | Age: 77
End: 2022-08-11
Attending: EMERGENCY MEDICINE
Payer: MEDICARE

## 2022-08-11 VITALS
RESPIRATION RATE: 16 BRPM | SYSTOLIC BLOOD PRESSURE: 174 MMHG | TEMPERATURE: 98 F | OXYGEN SATURATION: 100 % | HEART RATE: 84 BPM | DIASTOLIC BLOOD PRESSURE: 63 MMHG

## 2022-08-11 DIAGNOSIS — M10.9 ACUTE GOUT INVOLVING TOE OF LEFT FOOT, UNSPECIFIED CAUSE: Primary | ICD-10-CM

## 2022-08-11 PROCEDURE — 73660 X-RAY EXAM OF TOE(S): CPT | Performed by: EMERGENCY MEDICINE

## 2022-08-11 PROCEDURE — 99213 OFFICE O/P EST LOW 20 MIN: CPT

## 2022-08-11 RX ORDER — PREDNISONE 20 MG/1
60 TABLET ORAL DAILY
Qty: 15 TABLET | Refills: 0 | Status: SHIPPED | OUTPATIENT
Start: 2022-08-11 | End: 2022-08-16

## 2022-08-11 RX ORDER — IBUPROFEN 800 MG/1
800 TABLET ORAL EVERY 8 HOURS PRN
Qty: 21 TABLET | Refills: 0 | Status: SHIPPED | OUTPATIENT
Start: 2022-08-11 | End: 2022-08-18

## 2022-08-11 NOTE — ED INITIAL ASSESSMENT (HPI)
Pt c/o L foot pain, reddness and swelling just below great toe, pt stepped over a gate yesterday feeling pain to the area later in the evening

## 2022-08-15 RX ORDER — BENZONATATE 100 MG/1
CAPSULE ORAL
COMMUNITY
Start: 2022-07-19 | End: 2022-08-26 | Stop reason: CLARIF

## 2022-08-15 RX ORDER — ALBUTEROL SULFATE 90 UG/1
AEROSOL, METERED RESPIRATORY (INHALATION)
COMMUNITY
Start: 2022-07-19 | End: 2022-08-26

## 2022-08-26 ENCOUNTER — OFFICE VISIT (OUTPATIENT)
Dept: INTERNAL MEDICINE | Age: 77
End: 2022-08-26

## 2022-08-26 VITALS
SYSTOLIC BLOOD PRESSURE: 138 MMHG | OXYGEN SATURATION: 98 % | WEIGHT: 223.88 LBS | DIASTOLIC BLOOD PRESSURE: 83 MMHG | TEMPERATURE: 97.7 F | RESPIRATION RATE: 15 BRPM | BODY MASS INDEX: 37.3 KG/M2 | HEART RATE: 82 BPM | HEIGHT: 65 IN

## 2022-08-26 DIAGNOSIS — I10 ESSENTIAL HYPERTENSION: ICD-10-CM

## 2022-08-26 DIAGNOSIS — E11.9 TYPE 2 DIABETES MELLITUS WITHOUT COMPLICATION, WITHOUT LONG-TERM CURRENT USE OF INSULIN (CMD): ICD-10-CM

## 2022-08-26 DIAGNOSIS — M10.9 GOUT INVOLVING TOE OF LEFT FOOT, UNSPECIFIED CAUSE, UNSPECIFIED CHRONICITY: Primary | ICD-10-CM

## 2022-08-26 PROCEDURE — 3075F SYST BP GE 130 - 139MM HG: CPT | Performed by: INTERNAL MEDICINE

## 2022-08-26 PROCEDURE — 99213 OFFICE O/P EST LOW 20 MIN: CPT | Performed by: INTERNAL MEDICINE

## 2022-08-26 PROCEDURE — 3079F DIAST BP 80-89 MM HG: CPT | Performed by: INTERNAL MEDICINE

## 2022-08-26 RX ORDER — IBUPROFEN 800 MG/1
TABLET ORAL
COMMUNITY
Start: 2022-08-11 | End: 2022-08-26 | Stop reason: CLARIF

## 2022-08-26 RX ORDER — PREDNISONE 20 MG/1
TABLET ORAL
COMMUNITY
Start: 2022-08-11 | End: 2022-08-26 | Stop reason: ALTCHOICE

## 2022-08-26 RX ORDER — PREDNISONE 20 MG/1
60 TABLET ORAL DAILY
COMMUNITY
Start: 2022-08-11 | End: 2022-08-26 | Stop reason: ALTCHOICE

## 2022-08-26 RX ORDER — ALLOPURINOL 300 MG/1
300 TABLET ORAL DAILY
Qty: 90 TABLET | Refills: 3 | Status: SHIPPED | OUTPATIENT
Start: 2022-08-26 | End: 2023-06-18

## 2022-08-26 ASSESSMENT — ENCOUNTER SYMPTOMS
CONSTITUTIONAL NEGATIVE: 1
NEUROLOGICAL NEGATIVE: 1
EYES NEGATIVE: 1
RESPIRATORY NEGATIVE: 1
ALLERGIC/IMMUNOLOGIC NEGATIVE: 1
ENDOCRINE NEGATIVE: 1
PSYCHIATRIC NEGATIVE: 1
GASTROINTESTINAL NEGATIVE: 1
HEMATOLOGIC/LYMPHATIC NEGATIVE: 1

## 2022-08-26 ASSESSMENT — PATIENT HEALTH QUESTIONNAIRE - PHQ9
CLINICAL INTERPRETATION OF PHQ2 SCORE: NO FURTHER SCREENING NEEDED
SUM OF ALL RESPONSES TO PHQ9 QUESTIONS 1 AND 2: 0
SUM OF ALL RESPONSES TO PHQ9 QUESTIONS 1 AND 2: 0
2. FEELING DOWN, DEPRESSED OR HOPELESS: NOT AT ALL
1. LITTLE INTEREST OR PLEASURE IN DOING THINGS: NOT AT ALL

## 2022-09-15 ENCOUNTER — TELEPHONE (OUTPATIENT)
Dept: INTERNAL MEDICINE | Age: 77
End: 2022-09-15

## 2022-09-16 ENCOUNTER — LAB SERVICES (OUTPATIENT)
Dept: LAB | Age: 77
End: 2022-09-16

## 2022-09-16 DIAGNOSIS — M10.9 GOUT INVOLVING TOE OF LEFT FOOT, UNSPECIFIED CAUSE, UNSPECIFIED CHRONICITY: ICD-10-CM

## 2022-09-16 DIAGNOSIS — I10 ESSENTIAL HYPERTENSION: ICD-10-CM

## 2022-09-16 DIAGNOSIS — E78.00 HYPERCHOLESTEROLEMIA: ICD-10-CM

## 2022-09-16 DIAGNOSIS — E11.9 TYPE 2 DIABETES MELLITUS WITHOUT COMPLICATION, WITHOUT LONG-TERM CURRENT USE OF INSULIN (CMD): ICD-10-CM

## 2022-09-16 LAB
ALBUMIN SERPL-MCNC: 3.6 G/DL (ref 3.6–5.1)
ALBUMIN/GLOB SERPL: 1 {RATIO} (ref 1–2.4)
ALP SERPL-CCNC: 58 UNITS/L (ref 45–117)
ALT SERPL-CCNC: 30 UNITS/L
ANION GAP SERPL CALC-SCNC: 12 MMOL/L (ref 7–19)
AST SERPL-CCNC: 17 UNITS/L
BASOPHILS # BLD: 0.1 K/MCL (ref 0–0.3)
BASOPHILS NFR BLD: 1 %
BILIRUB SERPL-MCNC: 0.5 MG/DL (ref 0.2–1)
BUN SERPL-MCNC: 26 MG/DL (ref 6–20)
BUN/CREAT SERPL: 27 (ref 7–25)
CALCIUM SERPL-MCNC: 8.8 MG/DL (ref 8.4–10.2)
CHLORIDE SERPL-SCNC: 106 MMOL/L (ref 97–110)
CHOLEST SERPL-MCNC: 146 MG/DL
CHOLEST/HDLC SERPL: 2.2 {RATIO}
CO2 SERPL-SCNC: 26 MMOL/L (ref 21–32)
CREAT SERPL-MCNC: 0.98 MG/DL (ref 0.51–0.95)
DEPRECATED RDW RBC: 49.5 FL (ref 39–50)
EOSINOPHIL # BLD: 0.2 K/MCL (ref 0–0.5)
EOSINOPHIL NFR BLD: 2 %
ERYTHROCYTE [DISTWIDTH] IN BLOOD: 14.2 % (ref 11–15)
FASTING DURATION TIME PATIENT: ABNORMAL H
FASTING DURATION TIME PATIENT: NORMAL H
GFR SERPLBLD BASED ON 1.73 SQ M-ARVRAT: 60 ML/MIN
GLOBULIN SER-MCNC: 3.5 G/DL (ref 2–4)
GLUCOSE SERPL-MCNC: 115 MG/DL (ref 70–99)
HBA1C MFR BLD: 6.1 % (ref 4.5–5.6)
HCT VFR BLD CALC: 40.8 % (ref 36–46.5)
HDLC SERPL-MCNC: 67 MG/DL
HGB BLD-MCNC: 13.2 G/DL (ref 12–15.5)
IMM GRANULOCYTES # BLD AUTO: 0.1 K/MCL (ref 0–0.2)
IMM GRANULOCYTES # BLD: 1 %
LDLC SERPL CALC-MCNC: 58 MG/DL
LYMPHOCYTES # BLD: 1.2 K/MCL (ref 1–4)
LYMPHOCYTES NFR BLD: 12 %
MCH RBC QN AUTO: 30.4 PG (ref 26–34)
MCHC RBC AUTO-ENTMCNC: 32.4 G/DL (ref 32–36.5)
MCV RBC AUTO: 94 FL (ref 78–100)
MONOCYTES # BLD: 0.7 K/MCL (ref 0.3–0.9)
MONOCYTES NFR BLD: 7 %
NEUTROPHILS # BLD: 7.7 K/MCL (ref 1.8–7.7)
NEUTROPHILS NFR BLD: 77 %
NONHDLC SERPL-MCNC: 79 MG/DL
NRBC BLD MANUAL-RTO: 0 /100 WBC
PLATELET # BLD AUTO: 197 K/MCL (ref 140–450)
POTASSIUM SERPL-SCNC: 3.9 MMOL/L (ref 3.4–5.1)
PROT SERPL-MCNC: 7.1 G/DL (ref 6.4–8.2)
RBC # BLD: 4.34 MIL/MCL (ref 4–5.2)
SODIUM SERPL-SCNC: 140 MMOL/L (ref 135–145)
TRIGL SERPL-MCNC: 104 MG/DL
TSH SERPL-ACNC: 2.19 MCUNITS/ML (ref 0.35–5)
URATE SERPL-MCNC: 4.5 MG/DL (ref 2.6–5.9)
WBC # BLD: 10 K/MCL (ref 4.2–11)

## 2022-09-16 PROCEDURE — 36415 COLL VENOUS BLD VENIPUNCTURE: CPT | Performed by: INTERNAL MEDICINE

## 2022-09-16 PROCEDURE — 84550 ASSAY OF BLOOD/URIC ACID: CPT | Performed by: INTERNAL MEDICINE

## 2022-09-16 PROCEDURE — 80050 GENERAL HEALTH PANEL: CPT | Performed by: INTERNAL MEDICINE

## 2022-09-16 PROCEDURE — 80061 LIPID PANEL: CPT | Performed by: INTERNAL MEDICINE

## 2022-09-16 PROCEDURE — 83036 HEMOGLOBIN GLYCOSYLATED A1C: CPT | Performed by: INTERNAL MEDICINE

## 2022-09-19 ENCOUNTER — HOSPITAL ENCOUNTER (OUTPATIENT)
Dept: MAMMOGRAPHY | Age: 77
Discharge: HOME | End: 2022-09-19
Attending: INTERNAL MEDICINE

## 2022-09-19 DIAGNOSIS — Z12.31 VISIT FOR SCREENING MAMMOGRAM: ICD-10-CM

## 2022-09-19 PROCEDURE — 77063 BREAST TOMOSYNTHESIS BI: CPT

## 2022-09-30 ENCOUNTER — OFFICE VISIT (OUTPATIENT)
Dept: INTERNAL MEDICINE | Age: 77
End: 2022-09-30

## 2022-09-30 VITALS
HEART RATE: 80 BPM | BODY MASS INDEX: 37.13 KG/M2 | RESPIRATION RATE: 16 BRPM | HEIGHT: 65 IN | DIASTOLIC BLOOD PRESSURE: 79 MMHG | OXYGEN SATURATION: 98 % | SYSTOLIC BLOOD PRESSURE: 130 MMHG | WEIGHT: 222.88 LBS | TEMPERATURE: 99.1 F

## 2022-09-30 DIAGNOSIS — E11.9 TYPE 2 DIABETES MELLITUS WITHOUT COMPLICATION, WITHOUT LONG-TERM CURRENT USE OF INSULIN (CMD): Primary | ICD-10-CM

## 2022-09-30 DIAGNOSIS — Z28.21 REFUSED INFLUENZA VACCINE: ICD-10-CM

## 2022-09-30 DIAGNOSIS — Z28.20 VACCINE REFUSED BY PATIENT: ICD-10-CM

## 2022-09-30 DIAGNOSIS — K21.9 GASTROESOPHAGEAL REFLUX DISEASE WITHOUT ESOPHAGITIS: ICD-10-CM

## 2022-09-30 DIAGNOSIS — M10.9 GOUT, UNSPECIFIED CAUSE, UNSPECIFIED CHRONICITY, UNSPECIFIED SITE: ICD-10-CM

## 2022-09-30 DIAGNOSIS — I10 ESSENTIAL HYPERTENSION: ICD-10-CM

## 2022-09-30 DIAGNOSIS — E78.00 HYPERCHOLESTEROLEMIA: ICD-10-CM

## 2022-09-30 PROCEDURE — 3078F DIAST BP <80 MM HG: CPT | Performed by: INTERNAL MEDICINE

## 2022-09-30 PROCEDURE — 3075F SYST BP GE 130 - 139MM HG: CPT | Performed by: INTERNAL MEDICINE

## 2022-09-30 PROCEDURE — 99214 OFFICE O/P EST MOD 30 MIN: CPT | Performed by: INTERNAL MEDICINE

## 2022-09-30 ASSESSMENT — ENCOUNTER SYMPTOMS
PSYCHIATRIC NEGATIVE: 1
NEUROLOGICAL NEGATIVE: 1
GASTROINTESTINAL NEGATIVE: 1
RESPIRATORY NEGATIVE: 1
CONSTITUTIONAL NEGATIVE: 1
ENDOCRINE NEGATIVE: 1
ALLERGIC/IMMUNOLOGIC NEGATIVE: 1
EYES NEGATIVE: 1
HEMATOLOGIC/LYMPHATIC NEGATIVE: 1

## 2022-09-30 ASSESSMENT — PATIENT HEALTH QUESTIONNAIRE - PHQ9
1. LITTLE INTEREST OR PLEASURE IN DOING THINGS: NOT AT ALL
SUM OF ALL RESPONSES TO PHQ9 QUESTIONS 1 AND 2: 0
CLINICAL INTERPRETATION OF PHQ2 SCORE: NO FURTHER SCREENING NEEDED
2. FEELING DOWN, DEPRESSED OR HOPELESS: NOT AT ALL
SUM OF ALL RESPONSES TO PHQ9 QUESTIONS 1 AND 2: 0

## 2022-11-10 RX ORDER — METFORMIN HYDROCHLORIDE 500 MG/1
TABLET, EXTENDED RELEASE ORAL
Qty: 90 TABLET | Refills: 3 | Status: SHIPPED | OUTPATIENT
Start: 2022-11-10 | End: 2023-11-12

## 2022-11-17 RX ORDER — QUINAPRIL 20 MG/1
TABLET ORAL
Qty: 90 TABLET | Refills: 3 | Status: SHIPPED | OUTPATIENT
Start: 2022-11-17 | End: 2023-08-26 | Stop reason: SDUPTHER

## 2022-11-17 RX ORDER — LOVASTATIN 20 MG/1
TABLET ORAL
Qty: 180 TABLET | Refills: 3 | Status: SHIPPED | OUTPATIENT
Start: 2022-11-17 | End: 2023-11-12

## 2022-12-26 ENCOUNTER — LAB SERVICES (OUTPATIENT)
Dept: LAB | Age: 77
End: 2022-12-26

## 2022-12-26 DIAGNOSIS — E11.9 TYPE 2 DIABETES MELLITUS WITHOUT COMPLICATION, WITHOUT LONG-TERM CURRENT USE OF INSULIN (CMD): ICD-10-CM

## 2022-12-26 LAB — HBA1C MFR BLD: 6.1 % (ref 4.5–5.6)

## 2022-12-26 PROCEDURE — 36415 COLL VENOUS BLD VENIPUNCTURE: CPT | Performed by: INTERNAL MEDICINE

## 2022-12-26 PROCEDURE — 83036 HEMOGLOBIN GLYCOSYLATED A1C: CPT | Performed by: INTERNAL MEDICINE

## 2023-01-03 DIAGNOSIS — E11.9 TYPE 2 DIABETES MELLITUS WITHOUT COMPLICATION, WITHOUT LONG-TERM CURRENT USE OF INSULIN (CMD): Primary | ICD-10-CM

## 2023-01-06 ENCOUNTER — TELEPHONE (OUTPATIENT)
Dept: INTERNAL MEDICINE | Age: 78
End: 2023-01-06

## 2023-01-15 ENCOUNTER — E-ADVICE (OUTPATIENT)
Dept: INTERNAL MEDICINE | Age: 78
End: 2023-01-15

## 2023-01-15 DIAGNOSIS — N95.2 ATROPHIC VAGINITIS: ICD-10-CM

## 2023-01-15 RX ORDER — ESTRADIOL 10 UG/1
10 INSERT VAGINAL
Qty: 24 TABLET | Refills: 3 | Status: SHIPPED | OUTPATIENT
Start: 2023-01-16 | End: 2023-01-27 | Stop reason: SDUPTHER

## 2023-01-27 RX ORDER — ESTRADIOL 10 UG/1
10 INSERT VAGINAL
Qty: 24 TABLET | Refills: 3 | Status: SHIPPED | OUTPATIENT
Start: 2023-01-30 | End: 2023-09-25 | Stop reason: ALTCHOICE

## 2023-01-29 RX ORDER — HYDROCHLOROTHIAZIDE 12.5 MG/1
CAPSULE, GELATIN COATED ORAL
Qty: 90 CAPSULE | Refills: 3 | Status: SHIPPED | OUTPATIENT
Start: 2023-01-29

## 2023-01-29 RX ORDER — CITALOPRAM 20 MG/1
TABLET ORAL
Qty: 90 TABLET | Refills: 3 | Status: SHIPPED | OUTPATIENT
Start: 2023-01-29

## 2023-01-29 RX ORDER — PANTOPRAZOLE SODIUM 40 MG/1
TABLET, DELAYED RELEASE ORAL
Qty: 90 TABLET | Refills: 3 | Status: SHIPPED | OUTPATIENT
Start: 2023-01-29

## 2023-02-25 ENCOUNTER — E-ADVICE (OUTPATIENT)
Dept: INTERNAL MEDICINE | Age: 78
End: 2023-02-25

## 2023-02-26 RX ORDER — FLUCONAZOLE 150 MG/1
150 TABLET ORAL ONCE
Qty: 1 TABLET | Refills: 0 | Status: SHIPPED | OUTPATIENT
Start: 2023-02-26 | End: 2023-02-26

## 2023-02-26 RX ORDER — CLOTRIMAZOLE AND BETAMETHASONE DIPROPIONATE 10; .5 MG/ML; MG/ML
LOTION TOPICAL 2 TIMES DAILY
Qty: 30 ML | Refills: 3 | Status: SHIPPED | OUTPATIENT
Start: 2023-02-26 | End: 2023-09-25 | Stop reason: ALTCHOICE

## 2023-03-11 ENCOUNTER — OFFICE VISIT (OUTPATIENT)
Dept: INTERNAL MEDICINE | Age: 78
End: 2023-03-11

## 2023-03-11 VITALS
TEMPERATURE: 100 F | BODY MASS INDEX: 37.13 KG/M2 | OXYGEN SATURATION: 96 % | HEART RATE: 96 BPM | DIASTOLIC BLOOD PRESSURE: 81 MMHG | HEIGHT: 65 IN | RESPIRATION RATE: 16 BRPM | WEIGHT: 222.88 LBS | SYSTOLIC BLOOD PRESSURE: 133 MMHG

## 2023-03-11 DIAGNOSIS — J06.9 VIRAL URI: Primary | ICD-10-CM

## 2023-03-11 DIAGNOSIS — L72.3 SEBACEOUS CYST: ICD-10-CM

## 2023-03-11 DIAGNOSIS — L30.9 ECZEMA, UNSPECIFIED TYPE: ICD-10-CM

## 2023-03-11 DIAGNOSIS — E11.9 TYPE 2 DIABETES MELLITUS WITHOUT COMPLICATION, WITHOUT LONG-TERM CURRENT USE OF INSULIN (CMD): ICD-10-CM

## 2023-03-11 PROCEDURE — 3075F SYST BP GE 130 - 139MM HG: CPT | Performed by: INTERNAL MEDICINE

## 2023-03-11 PROCEDURE — 3079F DIAST BP 80-89 MM HG: CPT | Performed by: INTERNAL MEDICINE

## 2023-03-11 PROCEDURE — 99214 OFFICE O/P EST MOD 30 MIN: CPT | Performed by: INTERNAL MEDICINE

## 2023-03-11 RX ORDER — FLUCONAZOLE 150 MG/1
TABLET ORAL
COMMUNITY
Start: 2023-02-26 | End: 2023-09-25 | Stop reason: ALTCHOICE

## 2023-03-11 RX ORDER — TRIAMCINOLONE ACETONIDE 5 MG/G
CREAM TOPICAL 2 TIMES DAILY
Qty: 30 G | Refills: 5 | Status: SHIPPED | OUTPATIENT
Start: 2023-03-11 | End: 2023-09-25 | Stop reason: ALTCHOICE

## 2023-03-11 RX ORDER — CODEINE PHOSPHATE AND GUAIFENESIN 10; 100 MG/5ML; MG/5ML
10 SOLUTION ORAL NIGHTLY PRN
Qty: 240 ML | Refills: 0 | Status: SHIPPED | OUTPATIENT
Start: 2023-03-11 | End: 2023-05-19 | Stop reason: ALTCHOICE

## 2023-03-11 RX ORDER — BENZONATATE 100 MG/1
100 CAPSULE ORAL 3 TIMES DAILY PRN
Qty: 90 CAPSULE | Refills: 1 | Status: SHIPPED | OUTPATIENT
Start: 2023-03-11 | End: 2023-09-25 | Stop reason: ALTCHOICE

## 2023-03-11 ASSESSMENT — PATIENT HEALTH QUESTIONNAIRE - PHQ9
2. FEELING DOWN, DEPRESSED OR HOPELESS: NOT AT ALL
1. LITTLE INTEREST OR PLEASURE IN DOING THINGS: NOT AT ALL
SUM OF ALL RESPONSES TO PHQ9 QUESTIONS 1 AND 2: 0
CLINICAL INTERPRETATION OF PHQ2 SCORE: NO FURTHER SCREENING NEEDED
SUM OF ALL RESPONSES TO PHQ9 QUESTIONS 1 AND 2: 0

## 2023-03-11 ASSESSMENT — COGNITIVE AND FUNCTIONAL STATUS - GENERAL
BECAUSE OF A PHYSICAL, MENTAL, OR EMOTIONAL CONDITION, DO YOU HAVE SERIOUS DIFFICULTY CONCENTRATING, REMEMBERING OR MAKING DECISIONS: NO
BECAUSE OF A PHYSICAL, MENTAL, OR EMOTIONAL CONDITION, DO YOU HAVE DIFFICULTY DOING ERRANDS ALONE: NO
DO YOU HAVE DIFFICULTY DRESSING OR BATHING: NO
DO YOU HAVE SERIOUS DIFFICULTY WALKING OR CLIMBING STAIRS: NO

## 2023-03-11 ASSESSMENT — ENCOUNTER SYMPTOMS: COUGH: 1

## 2023-03-11 ASSESSMENT — PAIN SCALES - GENERAL: PAINLEVEL: 0

## 2023-03-22 ENCOUNTER — E-ADVICE (OUTPATIENT)
Dept: INTERNAL MEDICINE | Age: 78
End: 2023-03-22

## 2023-03-22 RX ORDER — CEPHALEXIN 500 MG/1
500 CAPSULE ORAL 3 TIMES DAILY
Qty: 21 CAPSULE | Refills: 0 | Status: SHIPPED | OUTPATIENT
Start: 2023-03-22 | End: 2023-03-29

## 2023-04-07 ENCOUNTER — E-ADVICE (OUTPATIENT)
Dept: INTERNAL MEDICINE | Age: 78
End: 2023-04-07

## 2023-04-07 RX ORDER — MONTELUKAST SODIUM 10 MG/1
10 TABLET ORAL NIGHTLY
Qty: 30 TABLET | Refills: 1 | Status: SHIPPED | OUTPATIENT
Start: 2023-04-07 | End: 2023-09-25 | Stop reason: ALTCHOICE

## 2023-05-01 ENCOUNTER — LAB SERVICES (OUTPATIENT)
Dept: LAB | Age: 78
End: 2023-05-01

## 2023-05-01 DIAGNOSIS — E11.9 TYPE 2 DIABETES MELLITUS WITHOUT COMPLICATION, WITHOUT LONG-TERM CURRENT USE OF INSULIN (CMD): ICD-10-CM

## 2023-05-01 DIAGNOSIS — E11.9 TYPE 2 DIABETES MELLITUS WITHOUT COMPLICATION, WITHOUT LONG-TERM CURRENT USE OF INSULIN (CMD): Primary | ICD-10-CM

## 2023-05-01 LAB — HBA1C MFR BLD: 6 % (ref 4.5–5.6)

## 2023-05-01 PROCEDURE — 83036 HEMOGLOBIN GLYCOSYLATED A1C: CPT | Performed by: INTERNAL MEDICINE

## 2023-05-01 PROCEDURE — 36415 COLL VENOUS BLD VENIPUNCTURE: CPT | Performed by: INTERNAL MEDICINE

## 2023-05-02 ENCOUNTER — E-ADVICE (OUTPATIENT)
Dept: INTERNAL MEDICINE | Age: 78
End: 2023-05-02

## 2023-05-02 RX ORDER — DOXEPIN HYDROCHLORIDE 10 MG/1
10 CAPSULE ORAL NIGHTLY
Qty: 30 CAPSULE | Refills: 5 | Status: SHIPPED | OUTPATIENT
Start: 2023-05-02 | End: 2023-09-25 | Stop reason: ALTCHOICE

## 2023-05-19 ENCOUNTER — OFFICE VISIT (OUTPATIENT)
Dept: INTERNAL MEDICINE | Age: 78
End: 2023-05-19

## 2023-05-19 VITALS
SYSTOLIC BLOOD PRESSURE: 112 MMHG | RESPIRATION RATE: 20 BRPM | HEIGHT: 65 IN | WEIGHT: 222 LBS | TEMPERATURE: 99.6 F | HEART RATE: 90 BPM | DIASTOLIC BLOOD PRESSURE: 73 MMHG | BODY MASS INDEX: 36.99 KG/M2

## 2023-05-19 DIAGNOSIS — M10.9 GOUT, UNSPECIFIED CAUSE, UNSPECIFIED CHRONICITY, UNSPECIFIED SITE: ICD-10-CM

## 2023-05-19 DIAGNOSIS — E78.00 HYPERCHOLESTEROLEMIA: ICD-10-CM

## 2023-05-19 DIAGNOSIS — L73.9 FOLLICULITIS: Primary | ICD-10-CM

## 2023-05-19 DIAGNOSIS — I10 ESSENTIAL HYPERTENSION: ICD-10-CM

## 2023-05-19 PROCEDURE — 99214 OFFICE O/P EST MOD 30 MIN: CPT | Performed by: INTERNAL MEDICINE

## 2023-05-19 PROCEDURE — 3074F SYST BP LT 130 MM HG: CPT | Performed by: INTERNAL MEDICINE

## 2023-05-19 PROCEDURE — 3078F DIAST BP <80 MM HG: CPT | Performed by: INTERNAL MEDICINE

## 2023-05-19 RX ORDER — PREDNISONE 10 MG/1
TABLET ORAL
COMMUNITY
Start: 2023-04-10 | End: 2023-09-25 | Stop reason: ALTCHOICE

## 2023-05-19 RX ORDER — DESOXIMETASONE 2.5 MG/G
CREAM TOPICAL
COMMUNITY
Start: 2023-04-10 | End: 2023-09-25 | Stop reason: ALTCHOICE

## 2023-05-19 ASSESSMENT — PATIENT HEALTH QUESTIONNAIRE - PHQ9
SUM OF ALL RESPONSES TO PHQ9 QUESTIONS 1 AND 2: 0
2. FEELING DOWN, DEPRESSED OR HOPELESS: NOT AT ALL
CLINICAL INTERPRETATION OF PHQ2 SCORE: NO FURTHER SCREENING NEEDED
1. LITTLE INTEREST OR PLEASURE IN DOING THINGS: NOT AT ALL
SUM OF ALL RESPONSES TO PHQ9 QUESTIONS 1 AND 2: 0

## 2023-05-19 ASSESSMENT — PAIN SCALES - GENERAL: PAINLEVEL: 0

## 2023-05-19 ASSESSMENT — ENCOUNTER SYMPTOMS
NEUROLOGICAL NEGATIVE: 1
ALLERGIC/IMMUNOLOGIC NEGATIVE: 1
RESPIRATORY NEGATIVE: 1
PSYCHIATRIC NEGATIVE: 1
ENDOCRINE NEGATIVE: 1
CONSTITUTIONAL NEGATIVE: 1
HEMATOLOGIC/LYMPHATIC NEGATIVE: 1
EYES NEGATIVE: 1
GASTROINTESTINAL NEGATIVE: 1

## 2023-06-18 RX ORDER — ALLOPURINOL 300 MG/1
TABLET ORAL
Qty: 90 TABLET | Refills: 3 | Status: SHIPPED | OUTPATIENT
Start: 2023-06-18

## 2023-08-26 ENCOUNTER — E-ADVICE (OUTPATIENT)
Dept: INTERNAL MEDICINE | Age: 78
End: 2023-08-26

## 2023-08-26 ENCOUNTER — TELEPHONE (OUTPATIENT)
Dept: INTERNAL MEDICINE | Age: 78
End: 2023-08-26

## 2023-08-26 RX ORDER — LISINOPRIL 20 MG/1
20 TABLET ORAL DAILY
Qty: 90 TABLET | Refills: 3 | Status: SHIPPED | OUTPATIENT
Start: 2023-08-26 | End: 2023-09-27 | Stop reason: SDUPTHER

## 2023-08-26 RX ORDER — QUINAPRIL 20 MG/1
20 TABLET ORAL DAILY
Qty: 90 TABLET | Refills: 0 | Status: SHIPPED | OUTPATIENT
Start: 2023-08-26 | End: 2023-08-26 | Stop reason: RX

## 2023-08-28 ENCOUNTER — E-ADVICE (OUTPATIENT)
Dept: OTHER | Age: 78
End: 2023-08-28

## 2023-09-06 ENCOUNTER — TELEPHONE (OUTPATIENT)
Dept: ORTHOPEDICS CLINIC | Facility: CLINIC | Age: 78
End: 2023-09-06

## 2023-09-06 DIAGNOSIS — M25.551 RIGHT HIP PAIN: Primary | ICD-10-CM

## 2023-09-13 ENCOUNTER — LAB SERVICES (OUTPATIENT)
Dept: LAB | Age: 78
End: 2023-09-13

## 2023-09-13 DIAGNOSIS — I10 ESSENTIAL HYPERTENSION: ICD-10-CM

## 2023-09-13 DIAGNOSIS — M10.9 GOUT, UNSPECIFIED CAUSE, UNSPECIFIED CHRONICITY, UNSPECIFIED SITE: ICD-10-CM

## 2023-09-13 DIAGNOSIS — E11.9 TYPE 2 DIABETES MELLITUS WITHOUT COMPLICATION, WITHOUT LONG-TERM CURRENT USE OF INSULIN (CMD): ICD-10-CM

## 2023-09-13 DIAGNOSIS — E78.00 HYPERCHOLESTEROLEMIA: ICD-10-CM

## 2023-09-13 LAB
ALBUMIN SERPL-MCNC: 3.6 G/DL (ref 3.6–5.1)
ALBUMIN/GLOB SERPL: 1 {RATIO} (ref 1–2.4)
ALP SERPL-CCNC: 68 UNITS/L (ref 45–117)
ALT SERPL-CCNC: 31 UNITS/L
ANION GAP SERPL CALC-SCNC: 9 MMOL/L (ref 7–19)
AST SERPL-CCNC: 17 UNITS/L
BASOPHILS # BLD: 0.1 K/MCL (ref 0–0.3)
BASOPHILS NFR BLD: 1 %
BILIRUB SERPL-MCNC: 0.4 MG/DL (ref 0.2–1)
BUN SERPL-MCNC: 27 MG/DL (ref 6–20)
BUN/CREAT SERPL: 31 (ref 7–25)
CALCIUM SERPL-MCNC: 9 MG/DL (ref 8.4–10.2)
CHLORIDE SERPL-SCNC: 105 MMOL/L (ref 97–110)
CHOLEST SERPL-MCNC: 159 MG/DL
CHOLEST/HDLC SERPL: 2.4 {RATIO}
CO2 SERPL-SCNC: 28 MMOL/L (ref 21–32)
CREAT SERPL-MCNC: 0.88 MG/DL (ref 0.51–0.95)
DEPRECATED RDW RBC: 50.3 FL (ref 39–50)
EGFRCR SERPLBLD CKD-EPI 2021: 68 ML/MIN/{1.73_M2}
EOSINOPHIL # BLD: 0.2 K/MCL (ref 0–0.5)
EOSINOPHIL NFR BLD: 3 %
ERYTHROCYTE [DISTWIDTH] IN BLOOD: 14.1 % (ref 11–15)
FASTING DURATION TIME PATIENT: ABNORMAL H
GLOBULIN SER-MCNC: 3.7 G/DL (ref 2–4)
GLUCOSE SERPL-MCNC: 104 MG/DL (ref 70–99)
HBA1C MFR BLD: 6.1 % (ref 4.5–5.6)
HCT VFR BLD CALC: 42.1 % (ref 36–46.5)
HDLC SERPL-MCNC: 66 MG/DL
HGB BLD-MCNC: 13.4 G/DL (ref 12–15.5)
IMM GRANULOCYTES # BLD AUTO: 0.1 K/MCL (ref 0–0.2)
IMM GRANULOCYTES # BLD: 1 %
LDLC SERPL CALC-MCNC: 66 MG/DL
LYMPHOCYTES # BLD: 2 K/MCL (ref 1–4)
LYMPHOCYTES NFR BLD: 24 %
MCH RBC QN AUTO: 30.7 PG (ref 26–34)
MCHC RBC AUTO-ENTMCNC: 31.8 G/DL (ref 32–36.5)
MCV RBC AUTO: 96.3 FL (ref 78–100)
MONOCYTES # BLD: 0.6 K/MCL (ref 0.3–0.9)
MONOCYTES NFR BLD: 7 %
NEUTROPHILS # BLD: 5.2 K/MCL (ref 1.8–7.7)
NEUTROPHILS NFR BLD: 64 %
NONHDLC SERPL-MCNC: 93 MG/DL
NRBC BLD MANUAL-RTO: 0 /100 WBC
PLATELET # BLD AUTO: 199 K/MCL (ref 140–450)
POTASSIUM SERPL-SCNC: 4.2 MMOL/L (ref 3.4–5.1)
PROT SERPL-MCNC: 7.3 G/DL (ref 6.4–8.2)
RBC # BLD: 4.37 MIL/MCL (ref 4–5.2)
SODIUM SERPL-SCNC: 138 MMOL/L (ref 135–145)
TRIGL SERPL-MCNC: 135 MG/DL
TSH SERPL-ACNC: 4.05 MCUNITS/ML (ref 0.35–5)
URATE SERPL-MCNC: 3.7 MG/DL (ref 2.6–5.9)
WBC # BLD: 8.2 K/MCL (ref 4.2–11)

## 2023-09-13 PROCEDURE — 80050 GENERAL HEALTH PANEL: CPT | Performed by: CLINICAL MEDICAL LABORATORY

## 2023-09-13 PROCEDURE — 80061 LIPID PANEL: CPT | Performed by: CLINICAL MEDICAL LABORATORY

## 2023-09-13 PROCEDURE — 84550 ASSAY OF BLOOD/URIC ACID: CPT | Performed by: CLINICAL MEDICAL LABORATORY

## 2023-09-13 PROCEDURE — 36415 COLL VENOUS BLD VENIPUNCTURE: CPT | Performed by: INTERNAL MEDICINE

## 2023-09-13 PROCEDURE — 83036 HEMOGLOBIN GLYCOSYLATED A1C: CPT | Performed by: CLINICAL MEDICAL LABORATORY

## 2023-09-16 DIAGNOSIS — E11.9 TYPE 2 DIABETES MELLITUS WITHOUT COMPLICATION, WITHOUT LONG-TERM CURRENT USE OF INSULIN (CMD): Primary | ICD-10-CM

## 2023-09-19 ENCOUNTER — TELEPHONE (OUTPATIENT)
Dept: FAMILY MEDICINE | Age: 78
End: 2023-09-19

## 2023-09-20 ASSESSMENT — PATIENT HEALTH QUESTIONNAIRE - PHQ9
1. LITTLE INTEREST OR PLEASURE IN DOING THINGS: NOT AT ALL
2. FEELING DOWN, DEPRESSED OR HOPELESS: NOT AT ALL
CLINICAL INTERPRETATION OF PHQ2 SCORE: NO FURTHER SCREENING NEEDED
SUM OF ALL RESPONSES TO PHQ9 QUESTIONS 1 AND 2: 0
CLINICAL INTERPRETATION OF PHQ2 SCORE: 0

## 2023-09-27 ENCOUNTER — OFFICE VISIT (OUTPATIENT)
Dept: INTERNAL MEDICINE | Age: 78
End: 2023-09-27

## 2023-09-27 VITALS
WEIGHT: 222.44 LBS | DIASTOLIC BLOOD PRESSURE: 79 MMHG | OXYGEN SATURATION: 97 % | BODY MASS INDEX: 37.06 KG/M2 | SYSTOLIC BLOOD PRESSURE: 124 MMHG | TEMPERATURE: 98 F | HEART RATE: 93 BPM | HEIGHT: 65 IN

## 2023-09-27 DIAGNOSIS — E78.00 HYPERCHOLESTEROLEMIA: ICD-10-CM

## 2023-09-27 DIAGNOSIS — E11.69 TYPE 2 DIABETES MELLITUS WITH MORBID OBESITY (CMD): ICD-10-CM

## 2023-09-27 DIAGNOSIS — I10 ESSENTIAL HYPERTENSION: ICD-10-CM

## 2023-09-27 DIAGNOSIS — G89.29 CHRONIC RIGHT HIP PAIN: Primary | ICD-10-CM

## 2023-09-27 DIAGNOSIS — N32.81 OAB (OVERACTIVE BLADDER): ICD-10-CM

## 2023-09-27 DIAGNOSIS — E66.01 TYPE 2 DIABETES MELLITUS WITH MORBID OBESITY (CMD): ICD-10-CM

## 2023-09-27 DIAGNOSIS — F41.9 ANXIETY DISORDER, UNSPECIFIED TYPE: ICD-10-CM

## 2023-09-27 DIAGNOSIS — Z00.00 MEDICARE ANNUAL WELLNESS VISIT, SUBSEQUENT: ICD-10-CM

## 2023-09-27 DIAGNOSIS — Z12.31 VISIT FOR SCREENING MAMMOGRAM: ICD-10-CM

## 2023-09-27 DIAGNOSIS — M25.551 CHRONIC RIGHT HIP PAIN: Primary | ICD-10-CM

## 2023-09-27 PROCEDURE — 3078F DIAST BP <80 MM HG: CPT | Performed by: INTERNAL MEDICINE

## 2023-09-27 PROCEDURE — 99214 OFFICE O/P EST MOD 30 MIN: CPT | Performed by: INTERNAL MEDICINE

## 2023-09-27 PROCEDURE — 3074F SYST BP LT 130 MM HG: CPT | Performed by: INTERNAL MEDICINE

## 2023-09-27 PROCEDURE — G0439 PPPS, SUBSEQ VISIT: HCPCS | Performed by: INTERNAL MEDICINE

## 2023-09-27 RX ORDER — OXYBUTYNIN CHLORIDE 5 MG/1
5 TABLET, EXTENDED RELEASE ORAL DAILY
Qty: 90 TABLET | Refills: 0 | Status: SHIPPED | OUTPATIENT
Start: 2023-09-27

## 2023-09-27 RX ORDER — LISINOPRIL 20 MG/1
20 TABLET ORAL DAILY
Qty: 90 TABLET | Refills: 3 | Status: SHIPPED | OUTPATIENT
Start: 2023-09-27

## 2023-09-27 ASSESSMENT — ACTIVITIES OF DAILY LIVING (ADL)
NEEDS ASSISTANCE WITH FOOD: INDEPENDENT
EATING: INDEPENDENT
PILL BOX USED: YES
DOING HOUSEWORK: INDEPENDENT
ADL_SHORT_OF_BREATH: NO
MANAGING FINANCES: INDEPENDENT
ADL_SCORE: 12
GROCERY SHOPPING: INDEPENDENT
ADL_BEFORE_ADMISSION: INDEPENDENT
TAKING MEDICATION: INDEPENDENT
RECENT_DECLINE_ADL: NO
STIL DRIVING: YES
USING TELEPHONE: INDEPENDENT
USING TRANSPORTATION: INDEPENDENT
PREPARING MEALS: INDEPENDENT

## 2023-09-27 ASSESSMENT — MINI COG
TOTAL SCORE: 5
PATIENT ABLE TO FILL IN THE CLOCK FACE WITH 10 MINUTES PAST 11 O'CLOCK?: YES, CLOCK IS CORRECT
PATIENT WAS GIVEN REPEAT BACK WORDS FROM VERSION: 1 - BANANA SUNRISE CHAIR
PATIENT ABLE TO REPEAT THE 3 WORDS GIVEN PREVIOUSLY?: WAS ABLE TO REPEAT BACK 3 WORDS CORRECTLY

## 2023-09-27 ASSESSMENT — PATIENT HEALTH QUESTIONNAIRE - PHQ9
1. LITTLE INTEREST OR PLEASURE IN DOING THINGS: NOT AT ALL
CLINICAL INTERPRETATION OF PHQ2 SCORE: NO FURTHER SCREENING NEEDED
SUM OF ALL RESPONSES TO PHQ9 QUESTIONS 1 AND 2: 0
2. FEELING DOWN, DEPRESSED OR HOPELESS: NOT AT ALL
SUM OF ALL RESPONSES TO PHQ9 QUESTIONS 1 AND 2: 0

## 2023-09-27 ASSESSMENT — PAIN SCALES - GENERAL: PAINLEVEL: 0

## 2023-09-27 ASSESSMENT — VISUAL ACUITY
OD_CC: 20/20
OS_CC: 20/20

## 2023-10-03 ENCOUNTER — EXTERNAL RECORD (OUTPATIENT)
Dept: HEALTH INFORMATION MANAGEMENT | Facility: OTHER | Age: 78
End: 2023-10-03

## 2023-10-04 ENCOUNTER — HOSPITAL ENCOUNTER (OUTPATIENT)
Dept: CT IMAGING | Age: 78
Discharge: HOME | End: 2023-10-04
Attending: INTERNAL MEDICINE

## 2023-10-04 DIAGNOSIS — Z12.31 VISIT FOR SCREENING MAMMOGRAM: ICD-10-CM

## 2023-10-04 PROCEDURE — 77067 SCR MAMMO BI INCL CAD: CPT

## 2023-10-12 ENCOUNTER — EXTERNAL RECORD (OUTPATIENT)
Dept: HEALTH INFORMATION MANAGEMENT | Facility: OTHER | Age: 78
End: 2023-10-12

## 2023-10-18 ENCOUNTER — HOSPITAL ENCOUNTER (OUTPATIENT)
Dept: GENERAL RADIOLOGY | Age: 78
Discharge: HOME | End: 2023-10-18
Attending: ORTHOPAEDIC SURGERY
Payer: MEDICARE

## 2023-10-18 ENCOUNTER — OFFICE VISIT (OUTPATIENT)
Dept: ORTHOPEDICS CLINIC | Facility: CLINIC | Age: 78
End: 2023-10-18
Payer: MEDICARE

## 2023-10-18 VITALS — BODY MASS INDEX: 37.32 KG/M2 | WEIGHT: 224 LBS | HEIGHT: 65 IN

## 2023-10-18 DIAGNOSIS — M25.551 RIGHT HIP PAIN: ICD-10-CM

## 2023-10-18 DIAGNOSIS — M70.61 TROCHANTERIC BURSITIS OF RIGHT HIP: Primary | ICD-10-CM

## 2023-10-18 DIAGNOSIS — M76.31 ILIOTIBIAL BAND TENDINITIS OF RIGHT SIDE: ICD-10-CM

## 2023-10-18 PROCEDURE — 1160F RVW MEDS BY RX/DR IN RCRD: CPT | Performed by: ORTHOPAEDIC SURGERY

## 2023-10-18 PROCEDURE — 99213 OFFICE O/P EST LOW 20 MIN: CPT | Performed by: ORTHOPAEDIC SURGERY

## 2023-10-18 PROCEDURE — 1159F MED LIST DOCD IN RCRD: CPT | Performed by: ORTHOPAEDIC SURGERY

## 2023-10-18 PROCEDURE — 3008F BODY MASS INDEX DOCD: CPT | Performed by: ORTHOPAEDIC SURGERY

## 2023-10-18 PROCEDURE — 73502 X-RAY EXAM HIP UNI 2-3 VIEWS: CPT | Performed by: ORTHOPAEDIC SURGERY

## 2023-10-18 RX ORDER — OXYBUTYNIN CHLORIDE 5 MG/1
5 TABLET, EXTENDED RELEASE ORAL DAILY
COMMUNITY
Start: 2023-09-27

## 2023-10-18 RX ORDER — LISINOPRIL 20 MG/1
20 TABLET ORAL DAILY
COMMUNITY
Start: 2023-09-27

## 2023-10-18 RX ORDER — HYDROCORTISONE ACETATE 0.5 %
CREAM (GRAM) TOPICAL DAILY
COMMUNITY

## 2023-10-18 RX ORDER — ALLOPURINOL 300 MG/1
1 TABLET ORAL DAILY
COMMUNITY
Start: 2023-06-18

## 2023-10-18 NOTE — PROGRESS NOTES
[facility] Progress Note    Subjective: Patient returns after over a year for somewhat new/different pain about her hip. She states that about 8 weeks ago she developed severe pain around the hip, points the proximal lateral thigh. She states that pain was present with her first getting up and starting to walk, did improve somewhat with further steps. It did hurt more with weightbearing activities as well as direct pressure. She did have radiation down the outside of the thigh towards the knee. She did start physical therapy from her primary care doctor, states that 5 sessions have made her pain significantly better. She notes she does not have too much discomfort now and sees how she is continue to improve. Denies any pain in the groin. Objective: Patient appears comfortable, very pleasant. Passive hip range of motion is painless. Palpation over the greater trochanter reproduces location of pain complaint. There is no tenderness down the IT band. Imaging: X-rays of the right hip and pelvis personally viewed, independently interpreted and radiology report read. Demonstrates well-maintained hip joint space, minimal degenerative changes Kellgren-Jony grade 1. She does have scoliotic deformity noted of the lower lumbar spine on the AP pelvis. Assessment/Plan: 61-year-old female with recurrence of right hip trochanteric bursitis/IT band tendinitis. Revisited the discussion of treatment options with physical therapy is the mainstay. She has been doing this and has had excellent relief thus far. Encouraged her to continue with this and to transition into home exercise program.  Did provide AAKS handouts for trochanteric bursitis and IT band tendinitis. We did discuss medications and injections, she does not need to proceed with any of these today as she does feel significantly better.   Did provide her with a card for my partner Dr. Maryuri Eldridge if symptoms were to recur, she may wish to be evaluated by him for any further treatments including injections.     Florentin Sosa MD, [address] Orthopedic Surgery  Phone [phone]  Fax [phone]

## 2023-10-30 ENCOUNTER — PATIENT MESSAGE (OUTPATIENT)
Dept: ORTHOPEDICS CLINIC | Facility: CLINIC | Age: 78
End: 2023-10-30

## 2023-10-30 ENCOUNTER — TELEPHONE (OUTPATIENT)
Dept: OTHER | Age: 78
End: 2023-10-30

## 2023-10-30 RX ORDER — METHYLPREDNISOLONE 4 MG/1
TABLET ORAL
Qty: 21 TABLET | Refills: 0 | Status: SHIPPED | OUTPATIENT
Start: 2023-10-30

## 2023-10-30 NOTE — TELEPHONE ENCOUNTER
Please see patient request and advise if medication can be sent into the pharmacy     Patient seen leopoldo given for  and injection offered patient has not scheduled with  yet and does not want to do an injection

## 2023-11-01 ENCOUNTER — TELEPHONE (OUTPATIENT)
Dept: OTHER | Age: 78
End: 2023-11-01

## 2023-11-12 RX ORDER — LOVASTATIN 20 MG/1
TABLET ORAL
Qty: 180 TABLET | Refills: 3 | Status: SHIPPED | OUTPATIENT
Start: 2023-11-12

## 2023-11-12 RX ORDER — METFORMIN HYDROCHLORIDE 500 MG/1
TABLET, EXTENDED RELEASE ORAL
Qty: 90 TABLET | Refills: 3 | Status: SHIPPED | OUTPATIENT
Start: 2023-11-12

## 2023-11-27 ENCOUNTER — EXTERNAL RECORD (OUTPATIENT)
Dept: HEALTH INFORMATION MANAGEMENT | Facility: OTHER | Age: 78
End: 2023-11-27

## 2023-12-20 ENCOUNTER — LAB SERVICES (OUTPATIENT)
Dept: LAB | Age: 78
End: 2023-12-20

## 2023-12-20 DIAGNOSIS — E11.9 TYPE 2 DIABETES MELLITUS WITHOUT COMPLICATION, WITHOUT LONG-TERM CURRENT USE OF INSULIN (CMD): ICD-10-CM

## 2023-12-20 LAB
ANION GAP SERPL CALC-SCNC: 10 MMOL/L (ref 7–19)
BUN SERPL-MCNC: 24 MG/DL (ref 6–20)
BUN/CREAT SERPL: 26 (ref 7–25)
CALCIUM SERPL-MCNC: 8.8 MG/DL (ref 8.4–10.2)
CHLORIDE SERPL-SCNC: 107 MMOL/L (ref 97–110)
CO2 SERPL-SCNC: 26 MMOL/L (ref 21–32)
CREAT SERPL-MCNC: 0.92 MG/DL (ref 0.51–0.95)
EGFRCR SERPLBLD CKD-EPI 2021: 65 ML/MIN/{1.73_M2}
FASTING DURATION TIME PATIENT: ABNORMAL H
GLUCOSE SERPL-MCNC: 86 MG/DL (ref 70–99)
HBA1C MFR BLD: 6 % (ref 4.5–5.6)
POTASSIUM SERPL-SCNC: 4.2 MMOL/L (ref 3.4–5.1)
SODIUM SERPL-SCNC: 139 MMOL/L (ref 135–145)

## 2023-12-20 PROCEDURE — 80048 BASIC METABOLIC PNL TOTAL CA: CPT | Performed by: CLINICAL MEDICAL LABORATORY

## 2023-12-20 PROCEDURE — 36415 COLL VENOUS BLD VENIPUNCTURE: CPT | Performed by: INTERNAL MEDICINE

## 2023-12-20 PROCEDURE — 83036 HEMOGLOBIN GLYCOSYLATED A1C: CPT | Performed by: CLINICAL MEDICAL LABORATORY

## 2023-12-23 ENCOUNTER — E-ADVICE (OUTPATIENT)
Dept: INTERNAL MEDICINE | Age: 78
End: 2023-12-23

## 2023-12-23 RX ORDER — OXYBUTYNIN CHLORIDE 5 MG/1
5 TABLET, EXTENDED RELEASE ORAL DAILY
Qty: 30 TABLET | Refills: 3 | Status: SHIPPED | OUTPATIENT
Start: 2023-12-23

## 2023-12-23 RX ORDER — OXYBUTYNIN CHLORIDE 5 MG/1
5 TABLET, EXTENDED RELEASE ORAL DAILY
Qty: 90 TABLET | Refills: 3 | Status: SHIPPED | OUTPATIENT
Start: 2023-12-23 | End: 2023-12-23 | Stop reason: SDUPTHER

## 2023-12-25 DIAGNOSIS — E11.69 TYPE 2 DIABETES MELLITUS WITH MORBID OBESITY (CMD): Primary | ICD-10-CM

## 2023-12-25 DIAGNOSIS — E66.01 TYPE 2 DIABETES MELLITUS WITH MORBID OBESITY (CMD): Primary | ICD-10-CM

## 2024-01-25 RX ORDER — HYDROCHLOROTHIAZIDE 12.5 MG/1
CAPSULE, GELATIN COATED ORAL
Qty: 90 CAPSULE | Refills: 3 | Status: SHIPPED | OUTPATIENT
Start: 2024-01-25

## 2024-01-25 RX ORDER — CITALOPRAM 20 MG/1
TABLET ORAL
Qty: 90 TABLET | Refills: 3 | Status: SHIPPED | OUTPATIENT
Start: 2024-01-25

## 2024-01-25 RX ORDER — PANTOPRAZOLE SODIUM 40 MG/1
TABLET, DELAYED RELEASE ORAL
Qty: 90 TABLET | Refills: 3 | Status: SHIPPED | OUTPATIENT
Start: 2024-01-25

## 2024-03-01 ENCOUNTER — MED REC SCAN ONLY (OUTPATIENT)
Dept: ORTHOPEDICS CLINIC | Facility: CLINIC | Age: 79
End: 2024-03-01

## 2024-03-07 ENCOUNTER — PATIENT MESSAGE (OUTPATIENT)
Dept: ORTHOPEDICS CLINIC | Facility: CLINIC | Age: 79
End: 2024-03-07

## 2024-03-07 NOTE — TELEPHONE ENCOUNTER
From: Alicia Fonseca  To: Emery Stoddard  Sent: 3/7/2024 2:34 PM CST  Subject: Sports dr    Last time I was there you said if it doesn’t get better you would send me to a sports medicine dr , you never gave the name ! The groin is acting up I don’t know if I over did it with swimming, and classes, but this leg is not right, I’m wondering if an MRI would show more, if it’s knees hip or maybe a tear in the groin?  Thank you!  Alicia Fonseca

## 2024-03-11 ENCOUNTER — OFFICE VISIT (OUTPATIENT)
Dept: ORTHOPEDICS CLINIC | Facility: CLINIC | Age: 79
End: 2024-03-11
Payer: MEDICARE

## 2024-03-11 VITALS — HEIGHT: 65 IN | WEIGHT: 224 LBS | BODY MASS INDEX: 37.32 KG/M2

## 2024-03-11 DIAGNOSIS — M76.32 ILIOTIBIAL BAND SYNDROME OF LEFT SIDE: ICD-10-CM

## 2024-03-11 DIAGNOSIS — M76.31 ILIOTIBIAL BAND SYNDROME OF RIGHT SIDE: ICD-10-CM

## 2024-03-11 DIAGNOSIS — S76.011A HIP STRAIN, RIGHT, INITIAL ENCOUNTER: Primary | ICD-10-CM

## 2024-03-11 PROCEDURE — 3008F BODY MASS INDEX DOCD: CPT | Performed by: FAMILY MEDICINE

## 2024-03-11 PROCEDURE — 1125F AMNT PAIN NOTED PAIN PRSNT: CPT | Performed by: FAMILY MEDICINE

## 2024-03-11 PROCEDURE — 99204 OFFICE O/P NEW MOD 45 MIN: CPT | Performed by: FAMILY MEDICINE

## 2024-03-11 PROCEDURE — 1159F MED LIST DOCD IN RCRD: CPT | Performed by: FAMILY MEDICINE

## 2024-03-11 NOTE — H&P
Sports Medicine Clinic Note     Subjective:    Chief Complaint: Right groin pain and bilateral thigh pain.    History of Present Illness: This is a pleasant 78-year-old female with a history of right hip flexor strain and bilateral IT band syndrome, presenting with exacerbated right groin and lateral thigh pain following prolonged swimming activities. The patient has previously been seen by my colleague Dr. Stoddard and had undergone physical therapy and was prescribed PO steroid, which previously alleviated the symptoms. The patient is highly functional and active, working as a hairdresser and engaging in repetitive activities involving her right leg as well, such as operating a hydraulic chair. She also enjoys aerobic activities, including swimming, almost daily. She recalls the onset of her current symptoms began about two weeks ago, after performing breaststroke-like activities and treading water extensively. Patient admits it has been difficult maintaining home exercises previously found beneficial for hip flexor and IT band issues.    Objective:    Right Hip and Bilateral IT Bands Examination:    Inspection:  - No visible swelling or deformity.    Palpation:  - Tenderness at the right groin area, suggesting hip flexor strain.  - Bilateral tenderness along the IT band mid portion as well as distally.    Range of Motion:  - Full range of motion of the hip with pain upon active hip flexion and abduction.    Neurovascular:  - Sensation intact.  - Good distal pulses.    Special Tests:  - Positive Mark Anthony's test bilaterally.  - Pain on resisted hip flexion suggesting hip flexor strain.  - Negative WADE, FADIR, and Scour testing.  - Negative SLR bilaterally.    Diagnostic Tests:  - X-rays of the right hip and pelvis show mild degenerative joint disease with well-maintained joint space and minimal degenerative changes, Kellgren-Jony grade 1. A partially viewed scoliotic deformity of the lower lumbar spine is noted  on the AP pelvis view, no dedicated lumbar spine x-rays available.    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 Consider MRI or further workup of the lumbar spine if symptoms persist or worsen to evaluate for soft tissue involvement or possible other pain generators.  Therapy: Recommend a new course of physical therapy focusing on hip flexors and IT bands, emphasizing the importance of continuation with home exercises. She has responded very well to this in the past reportedly which increase resolve that these are her pain generators.  Medications: Tylenol for pain management as needed, considering the patient's age and renal function.  Procedures: Future consideration of iliopsoas bursa injection vs trigger point injections if symptoms do not improve with conservative management.  Activity Recommendations: Advise moderation in activities that exacerbate symptoms, especially swimming techniques that strain the hip flexors and IT bands. Encourage alternative low-impact aerobic exercises. Avoid treading water and breast stroke in swimming for now as these can definitely exacerbate ITBS.    Follow-Up: Tentatively scheduled in 4-6 weeks to reassess the condition and adjust the treatment plan as necessary. Encouraged the patient to adhere to the physical therapy regimen and home exercises strictly.    Emmanuel Clemons DO, SINGHM   Primary Care Sports Medicine    Department of Orthopaedic Surgery  [address] [address]   [address] [place] [address]    t: [phone]  f: [phone]      [facility]

## 2024-04-02 ENCOUNTER — LAB SERVICES (OUTPATIENT)
Dept: LAB | Age: 79
End: 2024-04-02

## 2024-04-02 DIAGNOSIS — E66.01 TYPE 2 DIABETES MELLITUS WITH MORBID OBESITY (CMD): ICD-10-CM

## 2024-04-02 DIAGNOSIS — E11.69 TYPE 2 DIABETES MELLITUS WITH MORBID OBESITY (CMD): ICD-10-CM

## 2024-04-02 LAB — HBA1C MFR BLD: 6 % (ref 4.5–5.6)

## 2024-04-02 PROCEDURE — 83036 HEMOGLOBIN GLYCOSYLATED A1C: CPT | Performed by: CLINICAL MEDICAL LABORATORY

## 2024-04-02 PROCEDURE — 36415 COLL VENOUS BLD VENIPUNCTURE: CPT | Performed by: INTERNAL MEDICINE

## 2024-04-10 ENCOUNTER — HOSPITAL ENCOUNTER (OUTPATIENT)
Age: 79
Discharge: HOME | End: 2024-04-10
Payer: MEDICARE

## 2024-04-10 VITALS
OXYGEN SATURATION: 97 % | TEMPERATURE: 98 F | HEIGHT: 65 IN | HEART RATE: 83 BPM | WEIGHT: 220 LBS | RESPIRATION RATE: 18 BRPM | DIASTOLIC BLOOD PRESSURE: 63 MMHG | SYSTOLIC BLOOD PRESSURE: 140 MMHG | BODY MASS INDEX: 36.65 KG/M2

## 2024-04-10 DIAGNOSIS — H61.21 IMPACTED CERUMEN OF RIGHT EAR: Primary | ICD-10-CM

## 2024-04-10 PROCEDURE — 69209 REMOVE IMPACTED EAR WAX UNI: CPT

## 2024-04-10 PROCEDURE — 99212 OFFICE O/P EST SF 10 MIN: CPT

## 2024-04-10 NOTE — ED PROVIDER NOTES
Patient Seen in: [facility]      History     Chief Complaint   Patient presents with    Ear Problem     Stated Complaint: Ears    Subjective:   HPI    78-year-old female presents with clogging sensation to right ear for 4 days.  No other symptoms reported.    Objective:   Past Medical History:    Depression    Other and unspecified hyperlipidemia    Unspecified essential hypertension              History reviewed. No pertinent surgical history.             Social History     Socioeconomic History    Marital status:    Tobacco Use    Smoking status: Former     Current packs/day: 0.00     Types: Cigarettes     Quit date: 1987     Years since quittin.3    Smokeless tobacco: Never   Vaping Use    Vaping status: Never Used   Substance and Sexual Activity    Alcohol use: No    Drug use: No   Other Topics Concern    Caffeine Concern No    Exercise Yes     Social Determinants of Health      Received from [facility]              Review of Systems    Positive for stated complaint: Ears  Other systems are as noted in HPI.  Constitutional and vital signs reviewed.      All other systems reviewed and negative except as noted above.    Physical Exam     ED Triage Vitals [04/10/24 1216]   /63   Pulse 83   Resp 18   Temp 98.2 °F (36.8 °C)   Temp src Temporal   SpO2 97 %   O2 Device None (Room air)       Current:/63   Pulse 83   Temp 98.2 °F (36.8 °C) (Temporal)   Resp 18   Ht 165.1 cm (5' 5\")   Wt 99.8 kg   SpO2 97%   BMI 36.61 kg/m²         Physical Exam  Vitals and nursing note reviewed.   Constitutional:       Appearance: She is well-developed.   HENT:      Head: Atraumatic.      Right Ear: External ear normal. There is impacted cerumen.      Left Ear: Tympanic membrane and external ear normal.   Eyes:      Conjunctiva/sclera: Conjunctivae normal.   Cardiovascular:      Rate and Rhythm: Normal rate.   Pulmonary:      Effort: Pulmonary effort is normal.    Musculoskeletal:      Cervical back: Normal range of motion and neck supple.   Skin:     General: Skin is warm and dry.      Capillary Refill: Capillary refill takes less than 2 seconds.   Neurological:      Mental Status: She is alert.   Psychiatric:         Mood and Affect: Mood normal.               ED Course   Labs Reviewed - No data to display                   MDM      78-year-old female presents to the IC with right ear clogged sensation.    Differential diagnosis includes but not limited to:  Cerumen impaction, eustachian tube dysfunction, otitis with effusion    Exam is consistent with cerumen impaction.  Agreed to ear irrigation and reports significant relief afterward.  Reevaluation after ear irrigation shows intact TM and no erythema.                               Medical Decision Making      Disposition and Plan     Clinical Impression:  1. Impacted cerumen of right ear         Disposition:  Discharge  4/10/2024 12:49 pm    Follow-up:  Trisha Jackson  [address] [facility] [address] - [address] [address]  [phone]                Medications Prescribed:  Current Discharge Medication List

## 2024-04-20 ENCOUNTER — E-ADVICE (OUTPATIENT)
Dept: INTERNAL MEDICINE | Age: 79
End: 2024-04-20

## 2024-04-21 RX ORDER — OXYBUTYNIN CHLORIDE 10 MG/1
10 TABLET, EXTENDED RELEASE ORAL DAILY
Qty: 90 TABLET | Refills: 3 | Status: SHIPPED | OUTPATIENT
Start: 2024-04-21

## 2024-04-21 RX ORDER — OXYBUTYNIN CHLORIDE 10 MG/1
10 TABLET, EXTENDED RELEASE ORAL DAILY
Qty: 30 TABLET | Refills: 0 | Status: SHIPPED | OUTPATIENT
Start: 2024-04-21

## 2024-05-18 ENCOUNTER — E-ADVICE (OUTPATIENT)
Dept: INTERNAL MEDICINE | Age: 79
End: 2024-05-18

## 2024-05-18 DIAGNOSIS — E11.69 TYPE 2 DIABETES MELLITUS WITH MORBID OBESITY  (CMD): ICD-10-CM

## 2024-05-18 DIAGNOSIS — E66.01 TYPE 2 DIABETES MELLITUS WITH MORBID OBESITY  (CMD): ICD-10-CM

## 2024-05-18 DIAGNOSIS — E78.00 HYPERCHOLESTEROLEMIA: Primary | ICD-10-CM

## 2024-05-18 DIAGNOSIS — M10.9 GOUT, UNSPECIFIED CAUSE, UNSPECIFIED CHRONICITY, UNSPECIFIED SITE: ICD-10-CM

## 2024-05-18 DIAGNOSIS — I10 ESSENTIAL HYPERTENSION: ICD-10-CM

## 2024-06-19 RX ORDER — ALLOPURINOL 300 MG/1
TABLET ORAL
Qty: 90 TABLET | Refills: 3 | Status: SHIPPED | OUTPATIENT
Start: 2024-06-19

## 2024-09-01 RX ORDER — LOVASTATIN 20 MG
TABLET ORAL
Qty: 180 TABLET | Refills: 3 | Status: SHIPPED | OUTPATIENT
Start: 2024-09-01

## 2024-09-01 RX ORDER — METFORMIN HCL 500 MG
TABLET, EXTENDED RELEASE 24 HR ORAL
Qty: 90 TABLET | Refills: 3 | Status: SHIPPED | OUTPATIENT
Start: 2024-09-01

## 2024-09-09 RX ORDER — LISINOPRIL 20 MG/1
20 TABLET ORAL DAILY
Qty: 90 TABLET | Refills: 3 | Status: SHIPPED | OUTPATIENT
Start: 2024-09-09

## 2024-09-18 ENCOUNTER — LAB SERVICES (OUTPATIENT)
Dept: LAB | Age: 79
End: 2024-09-18

## 2024-09-18 DIAGNOSIS — M10.9 GOUT, UNSPECIFIED CAUSE, UNSPECIFIED CHRONICITY, UNSPECIFIED SITE: ICD-10-CM

## 2024-09-18 DIAGNOSIS — E78.00 HYPERCHOLESTEROLEMIA: ICD-10-CM

## 2024-09-18 DIAGNOSIS — E11.69 TYPE 2 DIABETES MELLITUS WITH MORBID OBESITY  (CMD): ICD-10-CM

## 2024-09-18 DIAGNOSIS — I10 ESSENTIAL HYPERTENSION: ICD-10-CM

## 2024-09-18 DIAGNOSIS — E66.01 TYPE 2 DIABETES MELLITUS WITH MORBID OBESITY  (CMD): ICD-10-CM

## 2024-09-18 LAB
ALBUMIN SERPL-MCNC: 3.7 G/DL (ref 3.4–5)
ALBUMIN/GLOB SERPL: 1.1 {RATIO} (ref 1–2.4)
ALP SERPL-CCNC: 66 UNITS/L (ref 45–117)
ALT SERPL-CCNC: 27 UNITS/L
ANION GAP SERPL CALC-SCNC: 9 MMOL/L (ref 7–19)
AST SERPL-CCNC: 16 UNITS/L
BASOPHILS # BLD: 0.1 K/MCL (ref 0–0.3)
BASOPHILS NFR BLD: 1 %
BILIRUB SERPL-MCNC: 0.4 MG/DL (ref 0.2–1)
BUN SERPL-MCNC: 31 MG/DL (ref 6–20)
BUN/CREAT SERPL: 31 (ref 7–25)
CALCIUM SERPL-MCNC: 9.4 MG/DL (ref 8.4–10.2)
CHLORIDE SERPL-SCNC: 107 MMOL/L (ref 97–110)
CHOLEST SERPL-MCNC: 158 MG/DL
CHOLEST/HDLC SERPL: 2.1 {RATIO}
CO2 SERPL-SCNC: 27 MMOL/L (ref 21–32)
CREAT SERPL-MCNC: 1 MG/DL (ref 0.51–0.95)
CREAT UR-MCNC: 85 MG/DL
DEPRECATED RDW RBC: 50.8 FL (ref 39–50)
EGFRCR SERPLBLD CKD-EPI 2021: 58 ML/MIN/{1.73_M2}
EOSINOPHIL # BLD: 0.2 K/MCL (ref 0–0.5)
EOSINOPHIL NFR BLD: 3 %
ERYTHROCYTE [DISTWIDTH] IN BLOOD: 14.3 % (ref 11–15)
FASTING DURATION TIME PATIENT: ABNORMAL H
GLOBULIN SER-MCNC: 3.5 G/DL (ref 2–4)
GLUCOSE SERPL-MCNC: 103 MG/DL (ref 70–99)
HBA1C MFR BLD: 6.1 % (ref 4.5–5.6)
HCT VFR BLD CALC: 39.2 % (ref 36–46.5)
HDLC SERPL-MCNC: 76 MG/DL
HGB BLD-MCNC: 12.7 G/DL (ref 12–15.5)
IMM GRANULOCYTES # BLD AUTO: 0.1 K/MCL (ref 0–0.2)
IMM GRANULOCYTES # BLD: 1 %
LDLC SERPL CALC-MCNC: 63 MG/DL
LYMPHOCYTES # BLD: 1.8 K/MCL (ref 1–4)
LYMPHOCYTES NFR BLD: 22 %
MCH RBC QN AUTO: 31.3 PG (ref 26–34)
MCHC RBC AUTO-ENTMCNC: 32.4 G/DL (ref 32–36.5)
MCV RBC AUTO: 96.6 FL (ref 78–100)
MICROALBUMIN UR-MCNC: 0.71 MG/DL
MICROALBUMIN/CREAT UR: 8.4 MG/G
MONOCYTES # BLD: 0.7 K/MCL (ref 0.3–0.9)
MONOCYTES NFR BLD: 8 %
NEUTROPHILS # BLD: 5.5 K/MCL (ref 1.8–7.7)
NEUTROPHILS NFR BLD: 65 %
NONHDLC SERPL-MCNC: 82 MG/DL
NRBC BLD MANUAL-RTO: 0 /100 WBC
PLATELET # BLD AUTO: 193 K/MCL (ref 140–450)
POTASSIUM SERPL-SCNC: 4.6 MMOL/L (ref 3.4–5.1)
PROT SERPL-MCNC: 7.2 G/DL (ref 6.4–8.2)
RBC # BLD: 4.06 MIL/MCL (ref 4–5.2)
SODIUM SERPL-SCNC: 138 MMOL/L (ref 135–145)
TRIGL SERPL-MCNC: 97 MG/DL
TSH SERPL-ACNC: 3.67 MCUNITS/ML (ref 0.35–5)
URATE SERPL-MCNC: 4.1 MG/DL (ref 2.6–5.9)
WBC # BLD: 8.4 K/MCL (ref 4.2–11)

## 2024-09-18 PROCEDURE — 80061 LIPID PANEL: CPT | Performed by: CLINICAL MEDICAL LABORATORY

## 2024-09-18 PROCEDURE — 82043 UR ALBUMIN QUANTITATIVE: CPT | Performed by: CLINICAL MEDICAL LABORATORY

## 2024-09-18 PROCEDURE — 83036 HEMOGLOBIN GLYCOSYLATED A1C: CPT | Performed by: CLINICAL MEDICAL LABORATORY

## 2024-09-18 PROCEDURE — 80050 GENERAL HEALTH PANEL: CPT | Performed by: CLINICAL MEDICAL LABORATORY

## 2024-09-18 PROCEDURE — 82570 ASSAY OF URINE CREATININE: CPT | Performed by: CLINICAL MEDICAL LABORATORY

## 2024-09-18 PROCEDURE — 36415 COLL VENOUS BLD VENIPUNCTURE: CPT | Performed by: INTERNAL MEDICINE

## 2024-09-18 PROCEDURE — 84550 ASSAY OF BLOOD/URIC ACID: CPT | Performed by: CLINICAL MEDICAL LABORATORY

## 2024-09-24 SDOH — ECONOMIC STABILITY: GENERAL: WOULD YOU LIKE HELP WITH ANY OF THE FOLLOWING NEEDS?: I DON'T WANT HELP WITH ANY OF THESE

## 2024-09-24 SDOH — ECONOMIC STABILITY: HOUSING INSECURITY: WHAT IS YOUR LIVING SITUATION TODAY?: I HAVE A STEADY PLACE TO LIVE

## 2024-09-24 SDOH — ECONOMIC STABILITY: TRANSPORTATION INSECURITY
IN THE PAST 12 MONTHS, HAS LACK OF RELIABLE TRANSPORTATION KEPT YOU FROM MEDICAL APPOINTMENTS, MEETINGS, WORK OR FROM GETTING THINGS NEEDED FOR DAILY LIVING?: NO

## 2024-09-24 SDOH — ECONOMIC STABILITY: HOUSING INSECURITY: DO YOU HAVE PROBLEMS WITH ANY OF THE FOLLOWING?: NONE OF THE ABOVE

## 2024-09-24 SDOH — ECONOMIC STABILITY: FOOD INSECURITY: WITHIN THE PAST 12 MONTHS, THE FOOD YOU BOUGHT JUST DIDN'T LAST AND YOU DIDN'T HAVE MONEY TO GET MORE.: NEVER TRUE

## 2024-09-24 ASSESSMENT — PATIENT HEALTH QUESTIONNAIRE - PHQ9
SUM OF ALL RESPONSES TO PHQ9 QUESTIONS 1 AND 2: 0
CLINICAL INTERPRETATION OF PHQ2 SCORE: 0
2. FEELING DOWN, DEPRESSED OR HOPELESS: NOT AT ALL
1. LITTLE INTEREST OR PLEASURE IN DOING THINGS: NOT AT ALL
CLINICAL INTERPRETATION OF PHQ2 SCORE: NO FURTHER SCREENING NEEDED

## 2024-09-24 ASSESSMENT — SOCIAL DETERMINANTS OF HEALTH (SDOH): IN THE PAST 12 MONTHS, HAS THE ELECTRIC, GAS, OIL, OR WATER COMPANY THREATENED TO SHUT OFF SERVICE IN YOUR HOME?: NO

## 2024-09-26 ENCOUNTER — TELEPHONE (OUTPATIENT)
Dept: INTERNAL MEDICINE | Age: 79
End: 2024-09-26

## 2024-09-27 ENCOUNTER — APPOINTMENT (OUTPATIENT)
Dept: INTERNAL MEDICINE | Age: 79
End: 2024-09-27

## 2024-09-27 VITALS
OXYGEN SATURATION: 97 % | SYSTOLIC BLOOD PRESSURE: 128 MMHG | BODY MASS INDEX: 37.7 KG/M2 | HEART RATE: 89 BPM | TEMPERATURE: 98.6 F | HEIGHT: 65 IN | DIASTOLIC BLOOD PRESSURE: 78 MMHG | WEIGHT: 226.3 LBS | RESPIRATION RATE: 16 BRPM

## 2024-09-27 DIAGNOSIS — K21.9 GASTROESOPHAGEAL REFLUX DISEASE WITHOUT ESOPHAGITIS: ICD-10-CM

## 2024-09-27 DIAGNOSIS — R79.89 PRERENAL AZOTEMIA: ICD-10-CM

## 2024-09-27 DIAGNOSIS — E11.69 TYPE 2 DIABETES MELLITUS WITH MORBID OBESITY  (CMD): Primary | ICD-10-CM

## 2024-09-27 DIAGNOSIS — Z28.20 VACCINE REFUSED BY PATIENT: ICD-10-CM

## 2024-09-27 DIAGNOSIS — E66.01 TYPE 2 DIABETES MELLITUS WITH MORBID OBESITY  (CMD): Primary | ICD-10-CM

## 2024-09-27 DIAGNOSIS — N32.81 OAB (OVERACTIVE BLADDER): ICD-10-CM

## 2024-09-27 DIAGNOSIS — R73.03 PREDIABETES: ICD-10-CM

## 2024-09-27 DIAGNOSIS — E78.00 HYPERCHOLESTEROLEMIA: ICD-10-CM

## 2024-09-27 DIAGNOSIS — Z00.00 MEDICARE ANNUAL WELLNESS VISIT, SUBSEQUENT: Primary | ICD-10-CM

## 2024-09-27 DIAGNOSIS — I10 ESSENTIAL HYPERTENSION: ICD-10-CM

## 2024-09-27 RX ORDER — TOLTERODINE 4 MG/1
4 CAPSULE, EXTENDED RELEASE ORAL DAILY
Qty: 30 CAPSULE | Refills: 1 | Status: SHIPPED | OUTPATIENT
Start: 2024-09-27

## 2024-09-27 ASSESSMENT — VISUAL ACUITY
OS_CC: 20/30
OD_CC: 20/70

## 2024-09-27 ASSESSMENT — ENCOUNTER SYMPTOMS
HEMATOLOGIC/LYMPHATIC NEGATIVE: 1
ENDOCRINE NEGATIVE: 1
PSYCHIATRIC NEGATIVE: 1
RESPIRATORY NEGATIVE: 1
RESPIRATORY NEGATIVE: 1
EYES NEGATIVE: 1
CONSTITUTIONAL NEGATIVE: 1
PSYCHIATRIC NEGATIVE: 1
NEUROLOGICAL NEGATIVE: 1
HEMATOLOGIC/LYMPHATIC NEGATIVE: 1
EYES NEGATIVE: 1
GASTROINTESTINAL NEGATIVE: 1
NEUROLOGICAL NEGATIVE: 1
ENDOCRINE NEGATIVE: 1
ALLERGIC/IMMUNOLOGIC NEGATIVE: 1
ALLERGIC/IMMUNOLOGIC NEGATIVE: 1
CONSTITUTIONAL NEGATIVE: 1
GASTROINTESTINAL NEGATIVE: 1

## 2024-09-27 ASSESSMENT — MINI COG
PATIENT ABLE TO REPEAT THE 3 WORDS GIVEN PREVIOUSLY?: WAS ABLE TO REPEAT BACK 3 WORDS CORRECTLY
PATIENT WAS GIVEN REPEAT BACK WORDS FROM VERSION: 1 - BANANA SUNRISE CHAIR
TOTAL SCORE: 5
PATIENT ABLE TO FILL IN THE CLOCK FACE WITH 10 MINUTES PAST 11 O'CLOCK?: YES, CLOCK IS CORRECT

## 2024-09-27 ASSESSMENT — ACTIVITIES OF DAILY LIVING (ADL)
NEEDS_ASSIST: NO
ADL_SHORT_OF_BREATH: NO
SENSORY_SUPPORT_DEVICES: CONTACTS
ADL_SCORE: 12
ADL_BEFORE_ADMISSION: INDEPENDENT
RECENT_DECLINE_ADL: NO

## 2024-09-27 ASSESSMENT — PAIN SCALES - GENERAL: PAINLEVEL: 0

## 2024-10-07 ENCOUNTER — HOSPITAL ENCOUNTER (OUTPATIENT)
Dept: CT IMAGING | Age: 79
Discharge: HOME | End: 2024-10-07
Attending: INTERNAL MEDICINE

## 2024-10-07 DIAGNOSIS — Z12.31 VISIT FOR SCREENING MAMMOGRAM: ICD-10-CM

## 2024-10-07 DIAGNOSIS — Z78.0 ASYMPTOMATIC MENOPAUSAL STATE: ICD-10-CM

## 2024-10-07 PROCEDURE — 77080 DXA BONE DENSITY AXIAL: CPT

## 2024-10-07 PROCEDURE — 77067 SCR MAMMO BI INCL CAD: CPT

## 2024-11-18 DIAGNOSIS — F41.9 ANXIETY DISORDER, UNSPECIFIED TYPE: ICD-10-CM

## 2024-11-18 DIAGNOSIS — K21.9 GASTROESOPHAGEAL REFLUX DISEASE WITHOUT ESOPHAGITIS: Primary | ICD-10-CM

## 2024-11-18 RX ORDER — HYDROCHLOROTHIAZIDE 12.5 MG/1
CAPSULE ORAL
Qty: 90 CAPSULE | Refills: 3 | Status: SHIPPED | OUTPATIENT
Start: 2024-11-18

## 2024-11-18 RX ORDER — CITALOPRAM HYDROBROMIDE 20 MG/1
TABLET ORAL
Qty: 90 TABLET | Refills: 3 | Status: SHIPPED | OUTPATIENT
Start: 2024-11-18

## 2024-11-18 RX ORDER — PANTOPRAZOLE SODIUM 40 MG/1
TABLET, DELAYED RELEASE ORAL
Qty: 90 TABLET | Refills: 3 | Status: SHIPPED | OUTPATIENT
Start: 2024-11-18

## 2025-01-03 ENCOUNTER — LAB SERVICES (OUTPATIENT)
Dept: LAB | Age: 80
End: 2025-01-03

## 2025-01-03 DIAGNOSIS — E66.01 TYPE 2 DIABETES MELLITUS WITH MORBID OBESITY  (CMD): ICD-10-CM

## 2025-01-03 DIAGNOSIS — E11.69 TYPE 2 DIABETES MELLITUS WITH MORBID OBESITY  (CMD): ICD-10-CM

## 2025-01-03 LAB — HBA1C MFR BLD: 6.1 % (ref 4.5–5.6)

## 2025-01-03 PROCEDURE — 83036 HEMOGLOBIN GLYCOSYLATED A1C: CPT | Performed by: CLINICAL MEDICAL LABORATORY

## 2025-01-03 PROCEDURE — 36415 COLL VENOUS BLD VENIPUNCTURE: CPT | Performed by: INTERNAL MEDICINE

## 2025-01-08 ENCOUNTER — E-ADVICE (OUTPATIENT)
Dept: INTERNAL MEDICINE | Age: 80
End: 2025-01-08

## 2025-01-31 ENCOUNTER — E-ADVICE (OUTPATIENT)
Dept: INTERNAL MEDICINE | Age: 80
End: 2025-01-31

## 2025-02-02 RX ORDER — OXYBUTYNIN CHLORIDE 5 MG/1
5 TABLET, EXTENDED RELEASE ORAL DAILY
Qty: 90 TABLET | Refills: 1 | Status: SHIPPED | OUTPATIENT
Start: 2025-02-02

## 2025-05-14 RX ORDER — ALLOPURINOL 300 MG/1
300 TABLET ORAL DAILY
Qty: 90 TABLET | Refills: 3 | Status: SHIPPED | OUTPATIENT
Start: 2025-05-14

## 2025-06-14 ENCOUNTER — HOSPITAL ENCOUNTER (OUTPATIENT)
Age: 80
Discharge: HOME | End: 2025-06-14
Attending: EMERGENCY MEDICINE
Payer: MEDICARE

## 2025-06-14 VITALS
WEIGHT: 220 LBS | DIASTOLIC BLOOD PRESSURE: 76 MMHG | OXYGEN SATURATION: 97 % | RESPIRATION RATE: 18 BRPM | SYSTOLIC BLOOD PRESSURE: 144 MMHG | TEMPERATURE: 98 F | HEART RATE: 85 BPM | BODY MASS INDEX: 37 KG/M2

## 2025-06-14 DIAGNOSIS — J06.9 VIRAL URI WITH COUGH: Primary | ICD-10-CM

## 2025-06-14 PROCEDURE — 99213 OFFICE O/P EST LOW 20 MIN: CPT

## 2025-06-14 RX ORDER — BENZONATATE 100 MG/1
100 CAPSULE ORAL 3 TIMES DAILY PRN
Qty: 20 CAPSULE | Refills: 0 | Status: SHIPPED | OUTPATIENT
Start: 2025-06-14

## 2025-06-14 NOTE — DISCHARGE INSTRUCTIONS
Follow-up with your primary care doctor  Continue taking Tylenol Motrin for any pain or for fever  Take benzonatate cough medicine 3 times a day as needed  Return if any worsening symptoms or new concern

## 2025-06-14 NOTE — ED PROVIDER NOTES
Patient Seen in: [facility]        History  No chief complaint on file.    Stated Complaint: Cold Symp    Subjective:   HPI          79-year-old female with a history of hypertension hyperlipidemia presents to the immediate care for complaints of watery eyes, runny nose and cough.  She denies any fevers or chills.  Denies any purulent sputum production.  Denies any recent ill contact.  Patient denies any myalgias.  She denies any other exacerbating factors.  No complaints of nausea vomit diarrhea.  Denies any recent ill contact.      No pertinent past medical history.            No pertinent past surgical history.              No pertinent social history.            Review of Systems    Positive for stated complaint: Cold Symp  Other systems are as noted in HPI.  Constitutional and vital signs reviewed.      All other systems reviewed and negative except as noted above.                  Physical Exam    ED Triage Vitals [06/14/25 1509]   /76   Pulse 85   Resp 18   Temp 98.1 °F (36.7 °C)   Temp src Oral   SpO2 97 %   O2 Device None (Room air)       Current Vitals:   Vital Signs  BP: 144/76  Pulse: 85  Resp: 18  Temp: 98.1 °F (36.7 °C)  Temp src: Oral    Oxygen Therapy  SpO2: 97 %  O2 Device: None (Room air)            Physical Exam     General: Alert and oriented. No acute distress.  HEENT: Normocephalic. No evidence of trauma. Extraocular movements are intact.  Cardiovascular exam: Regular rate and rhythm  Lungs: Clear to auscultation bilaterally.  Abdomen: Soft, nondistended, nontender.  Extremities: No evidence of deformity. No clubbing or cyanosis.  Neuro: No focal deficit is noted.      ED Course  Labs Reviewed - No data to display  Patient clinically appears well.  Her main complaint is a cough.  She has clear breath sounds bilaterally.  No complaints of fever.  No complaints of any productive sputum.  Suspect a viral etiology.  Patient will be discharged home with benzonatate cough  medicine.  Follow-up with her primary care doctor.                  MDM  Patient was screened and evaluated during this visit.   As a treating physician attending to the patient, I determined, within reasonable clinical confidence and prior to discharge, that an emergency medical condition was not or was no longer present.  There was no indication for further evaluation, treatment or admission on an emergency basis.  Comprehensive verbal and written discharge and follow-up instructions were provided to help prevent relapse or worsening.  Patient was instructed to follow-up with her primary care provider for further evaluation and treatment, but to return immediately to the ER for worsening, concerning, new, changing or persisting symptoms.  I discussed the case with the patient and they had no questions, complaints, or concerns.  Patient felt comfortable going home.    ^^Please note that this report has been produced using speech recognition software and may contain errors related to that system including, but not limited to, errors in grammar, punctuation, and spelling, as well as words and phrases that possibly may have been recognized inappropriately.  If there are any questions or concerns, contact the dictating provider for clarification        Medical Decision Making      Disposition and Plan     Clinical Impression:  1. Viral URI with cough         Disposition:  Discharge  6/14/2025  3:24 pm    Follow-up:  Trisha Jackson  [address] [address] - [address] [id number] [phone]    Call   As needed, If symptoms worsen          Medications Prescribed:  Current Discharge Medication List        START taking these medications    Details   benzonatate 100 MG Oral Cap Take 1 capsule (100 mg total) by mouth 3 (three) times daily as needed for cough.  Qty: 20 capsule, Refills: 0                   Supplementary Documentation:

## 2025-06-18 ENCOUNTER — HOSPITAL ENCOUNTER (OUTPATIENT)
Age: 80
Discharge: HOME | End: 2025-06-18
Payer: MEDICARE

## 2025-06-18 VITALS
OXYGEN SATURATION: 96 % | RESPIRATION RATE: 18 BRPM | DIASTOLIC BLOOD PRESSURE: 47 MMHG | HEART RATE: 87 BPM | SYSTOLIC BLOOD PRESSURE: 127 MMHG | TEMPERATURE: 99 F

## 2025-06-18 DIAGNOSIS — H65.192 ACUTE EFFUSION OF LEFT EAR: Primary | ICD-10-CM

## 2025-06-18 DIAGNOSIS — R05.9 COUGH, UNSPECIFIED TYPE: ICD-10-CM

## 2025-06-18 PROCEDURE — 99213 OFFICE O/P EST LOW 20 MIN: CPT

## 2025-06-18 RX ORDER — METHYLPREDNISOLONE 4 MG/1
TABLET ORAL
Qty: 1 EACH | Refills: 0 | Status: SHIPPED | OUTPATIENT
Start: 2025-06-18

## 2025-06-18 NOTE — ED PROVIDER NOTES
Patient Seen in: [facility]       The following individual(s) verbally consented to be recorded using ambient AI listening technology and understand that they can each withdraw their consent to this listening technology at any point by asking the clinician to turn off or pause the recording:    Patient name: Alicia Fonseca        History  No chief complaint on file.    Stated Complaint: Congestion: Ear Pain    Subjective:   HPI     Alicia Fonseca is a 79 year old female who presents with persistent dry cough and ear pain.    She has been experiencing significant head congestion since Saturday, initially thought to be due to sinuses or allergies. She was prescribed medication for a cough, described as 'little pearls', to address a tickling sensation in her throat.    Despite the medication, she continues to have a dry cough and notes that the congestion has now affected her ear, causing pain, especially when opening her mouth. She describes the sensation as 'it'll just grab you'.    No shortness of breath or fever. She is not coughing up any secretions. She mentions hearing crackling sounds when blowing her nose.    She drinks approximately two liters of water daily to stay hydrated.        Objective:     No pertinent past medical history.            No pertinent past surgical history.              No pertinent social history.            Review of Systems   Constitutional: Negative.    HENT:  Positive for congestion and ear pain.    Eyes: Negative.    Respiratory:  Positive for cough. Negative for shortness of breath.    Cardiovascular: Negative.    Gastrointestinal: Negative.    Endocrine: Negative.    Genitourinary: Negative.    Musculoskeletal: Negative.    Skin: Negative.    Allergic/Immunologic: Negative.    Neurological: Negative.    Hematological: Negative.    Psychiatric/Behavioral: Negative.         Positive for stated complaint: Congestion: Ear Pain  Other systems are as noted in  HPI.  Constitutional and vital signs reviewed.      All other systems reviewed and negative except as noted above.                  Physical Exam    ED Triage Vitals [06/18/25 1402]   /47   Pulse 87   Resp 18   Temp 98.5 °F (36.9 °C)   Temp src Oral   SpO2 96 %   O2 Device None (Room air)       Current Vitals:   Vital Signs  BP: 127/47  Pulse: 87  Resp: 18  Temp: 98.5 °F (36.9 °C)  Temp src: Oral    Oxygen Therapy  SpO2: 96 %  O2 Device: None (Room air)        Pertinent physical exam:      HEENT: Effusion fluid behind eardrum. Nose and throat normal.  CHEST: Clear to auscultation bilaterally.  CARDIOVASCULAR: Normal heart rate and rhythm.       Physical Exam  Vitals and nursing note reviewed.   Constitutional:       Appearance: Normal appearance. She is normal weight.   HENT:      Head: Normocephalic and atraumatic.      Ears:      Comments: Left tympanic membrane with clear effusion present.  TM intact.     Nose: Nose normal.      Mouth/Throat:      Mouth: Mucous membranes are moist.      Pharynx: Oropharynx is clear.   Eyes:      Extraocular Movements: Extraocular movements intact.      Conjunctiva/sclera: Conjunctivae normal.      Pupils: Pupils are equal, round, and reactive to light.   Cardiovascular:      Rate and Rhythm: Normal rate and regular rhythm.      Pulses: Normal pulses.      Heart sounds: Normal heart sounds.   Pulmonary:      Effort: Pulmonary effort is normal.      Breath sounds: Normal breath sounds.   Musculoskeletal:      Cervical back: Normal range of motion and neck supple.   Neurological:      Mental Status: She is alert.   Psychiatric:         Mood and Affect: Mood normal.               ED Course  Labs Reviewed - No data to display                         MDM     Alicia Fonseca is a 79 year old female who presents with persistent dry cough and ear pain.    She has been experiencing significant head congestion since Saturday, initially thought to be due to sinuses or allergies. She  was prescribed medication for a cough, described as 'little pearls', to address a tickling sensation in her throat.    Despite the medication, she continues to have a dry cough and notes that the congestion has now affected her ear, causing pain, especially when opening her mouth. She describes the sensation as 'it'll just grab you'.    No shortness of breath or fever. She is not coughing up any secretions. She mentions hearing crackling sounds when blowing her nose.    She drinks approximately two liters of water daily to stay hydrated.  Vital signs: Please see EMR.  Physical exam: Please see exam.  Differential diagnosis: Ear effusion, bronchitis, URI, ear infection.  Assessment & Plan  Sinusitis with ear effusion  Persistent sinus congestion with ear effusion causing pain. Viral or allergen etiology suspected. No infection or respiratory distress.  - Prescribed steroid taper for six days.  - Advised hydration of at least two liters daily.  - Instructed to replace toothbrush to prevent reinfection.  - Advised to return if shortness of breath or fever develops.  - Sent prescription to [facility].                Medical Decision Making  Alicia Fonseca is a 79 year old female who presents with persistent dry cough and ear pain.    She has been experiencing significant head congestion since Saturday, initially thought to be due to sinuses or allergies. She was prescribed medication for a cough, described as 'little pearls', to address a tickling sensation in her throat.    Despite the medication, she continues to have a dry cough and notes that the congestion has now affected her ear, causing pain, especially when opening her mouth. She describes the sensation as 'it'll just grab you'.    No shortness of breath or fever. She is not coughing up any secretions. She mentions hearing crackling sounds when blowing her nose.    She drinks approximately two liters of water daily to stay hydrated.    Problems  Addressed:  Acute effusion of left ear: acute illness or injury  Cough, unspecified type: acute illness or injury    Amount and/or Complexity of Data Reviewed  External Data Reviewed: notes.    Risk  Prescription drug management.        Disposition and Plan     Clinical Impression:  1. Acute effusion of left ear    2. Cough, unspecified type         Disposition:  Discharge  6/18/2025  2:36 pm    Follow-up:  Trisha Jackson  [address] [facility] [address] [address]  [phone]    In 1 week  As needed          Medications Prescribed:  Current Discharge Medication List        START taking these medications    Details   methylPREDNISolone (MEDROL) 4 MG Oral Tablet Therapy Pack Dosepack: take as directed  Qty: 1 each, Refills: 0                   Supplementary Documentation:

## 2025-06-18 NOTE — DISCHARGE INSTRUCTIONS
VISIT SUMMARY:  You came in today because of a persistent dry cough and ear pain. You have been experiencing significant head congestion since Saturday, which you initially thought was due to sinuses or allergies. Despite taking medication for the cough, you continue to have symptoms, and now the congestion has affected your ear, causing pain, especially when you open your mouth. You also mentioned hearing crackling sounds when blowing your nose.    YOUR PLAN:  -SINUSITIS WITH EAR EFFUSION: Sinusitis with ear effusion means that your sinuses are congested, and fluid has built up in your ear, causing pain. This is likely due to a virus or allergies, not an infection. We have prescribed a steroid taper for six days to help reduce the inflammation. Continue to drink at least two liters of water daily to stay hydrated. Replace your toothbrush to prevent reinfection. If you develop shortness of breath or a fever, please return for further evaluation. Your prescription has been sent to [facility].    INSTRUCTIONS:  Please follow up if you develop shortness of breath or a fever. Your prescription has been sent to [facility].    Contains text generated by Nadir

## 2025-07-06 RX ORDER — LISINOPRIL 20 MG/1
20 TABLET ORAL DAILY
Qty: 90 TABLET | Refills: 3 | Status: SHIPPED | OUTPATIENT
Start: 2025-07-06

## 2025-07-06 RX ORDER — OXYBUTYNIN CHLORIDE 5 MG/1
5 TABLET, EXTENDED RELEASE ORAL DAILY
Qty: 90 TABLET | Refills: 3 | Status: SHIPPED | OUTPATIENT
Start: 2025-07-06

## 2025-08-19 ENCOUNTER — E-ADVICE (OUTPATIENT)
Dept: INTERNAL MEDICINE | Age: 80
End: 2025-08-19

## 2025-08-19 DIAGNOSIS — Z00.00 MEDICARE ANNUAL WELLNESS VISIT, SUBSEQUENT: ICD-10-CM

## 2025-08-19 DIAGNOSIS — E66.1 CLASS 2 DRUG-INDUCED OBESITY WITHOUT SERIOUS COMORBIDITY WITH BODY MASS INDEX (BMI) OF 37.0 TO 37.9 IN ADULT: ICD-10-CM

## 2025-08-19 DIAGNOSIS — K21.9 GASTROESOPHAGEAL REFLUX DISEASE WITHOUT ESOPHAGITIS: ICD-10-CM

## 2025-08-19 DIAGNOSIS — Z12.31 SCREENING MAMMOGRAM, ENCOUNTER FOR: Primary | ICD-10-CM

## 2025-08-19 DIAGNOSIS — E66.01 TYPE 2 DIABETES MELLITUS WITH MORBID OBESITY (CMD): ICD-10-CM

## 2025-08-19 DIAGNOSIS — M10.00 ACUTE IDIOPATHIC GOUT, UNSPECIFIED SITE: ICD-10-CM

## 2025-08-19 DIAGNOSIS — E78.00 HYPERCHOLESTEROLEMIA: Primary | ICD-10-CM

## 2025-08-19 DIAGNOSIS — E11.69 TYPE 2 DIABETES MELLITUS WITH MORBID OBESITY (CMD): ICD-10-CM

## 2025-08-19 DIAGNOSIS — E66.812 CLASS 2 DRUG-INDUCED OBESITY WITHOUT SERIOUS COMORBIDITY WITH BODY MASS INDEX (BMI) OF 37.0 TO 37.9 IN ADULT: ICD-10-CM

## 2025-08-19 DIAGNOSIS — I10 ESSENTIAL HYPERTENSION: ICD-10-CM

## 2025-08-19 DIAGNOSIS — Z13.29 SCREENING FOR THYROID DISORDER: ICD-10-CM

## 2025-09-05 ENCOUNTER — LAB SERVICES (OUTPATIENT)
Dept: LAB | Age: 80
End: 2025-09-05

## 2025-09-05 DIAGNOSIS — E66.01 TYPE 2 DIABETES MELLITUS WITH MORBID OBESITY (CMD): ICD-10-CM

## 2025-09-05 DIAGNOSIS — M10.00 ACUTE IDIOPATHIC GOUT, UNSPECIFIED SITE: ICD-10-CM

## 2025-09-05 DIAGNOSIS — Z00.00 MEDICARE ANNUAL WELLNESS VISIT, SUBSEQUENT: ICD-10-CM

## 2025-09-05 DIAGNOSIS — E66.812 CLASS 2 DRUG-INDUCED OBESITY WITHOUT SERIOUS COMORBIDITY WITH BODY MASS INDEX (BMI) OF 37.0 TO 37.9 IN ADULT: ICD-10-CM

## 2025-09-05 DIAGNOSIS — Z13.29 SCREENING FOR THYROID DISORDER: ICD-10-CM

## 2025-09-05 DIAGNOSIS — I10 ESSENTIAL HYPERTENSION: ICD-10-CM

## 2025-09-05 DIAGNOSIS — E11.69 TYPE 2 DIABETES MELLITUS WITH MORBID OBESITY (CMD): ICD-10-CM

## 2025-09-05 DIAGNOSIS — K21.9 GASTROESOPHAGEAL REFLUX DISEASE WITHOUT ESOPHAGITIS: ICD-10-CM

## 2025-09-05 DIAGNOSIS — E66.1 CLASS 2 DRUG-INDUCED OBESITY WITHOUT SERIOUS COMORBIDITY WITH BODY MASS INDEX (BMI) OF 37.0 TO 37.9 IN ADULT: ICD-10-CM

## 2025-09-05 DIAGNOSIS — E78.00 HYPERCHOLESTEROLEMIA: ICD-10-CM

## 2025-09-05 LAB
ALBUMIN SERPL-MCNC: 3.8 G/DL (ref 3.4–5)
ALBUMIN/GLOB SERPL: 1.3 {RATIO} (ref 1–2.4)
ALP SERPL-CCNC: 64 UNITS/L (ref 45–117)
ALT SERPL-CCNC: 25 UNITS/L
ANION GAP SERPL CALC-SCNC: 11 MMOL/L (ref 7–19)
AST SERPL-CCNC: 17 UNITS/L
BASOPHILS # BLD: 0.1 K/MCL (ref 0–0.3)
BASOPHILS NFR BLD: 1 %
BILIRUB SERPL-MCNC: 0.4 MG/DL (ref 0.2–1)
BUN SERPL-MCNC: 32 MG/DL (ref 6–20)
BUN/CREAT SERPL: 35 (ref 7–25)
CALCIUM SERPL-MCNC: 8.8 MG/DL (ref 8.4–10.2)
CHLORIDE SERPL-SCNC: 106 MMOL/L (ref 97–110)
CHOLEST SERPL-MCNC: 147 MG/DL
CHOLEST/HDLC SERPL: 2 {RATIO}
CO2 SERPL-SCNC: 27 MMOL/L (ref 21–32)
CREAT SERPL-MCNC: 0.92 MG/DL (ref 0.51–0.95)
CREAT UR-MCNC: 68.8 MG/DL
DEPRECATED RDW RBC: 52.5 FL (ref 39–50)
EGFRCR SERPLBLD CKD-EPI 2021: 64 ML/MIN/{1.73_M2}
EOSINOPHIL # BLD: 0.3 K/MCL (ref 0–0.5)
EOSINOPHIL NFR BLD: 3 %
ERYTHROCYTE [DISTWIDTH] IN BLOOD: 14.6 % (ref 11–15)
FASTING DURATION TIME PATIENT: 12 HOURS (ref 0–999)
GLOBULIN SER-MCNC: 3 G/DL (ref 2–4)
GLUCOSE SERPL-MCNC: 98 MG/DL (ref 70–99)
HBA1C MFR BLD: 6.1 % (ref 4.5–5.6)
HCT VFR BLD CALC: 39.4 % (ref 36–46.5)
HDLC SERPL-MCNC: 72 MG/DL
HGB BLD-MCNC: 12.5 G/DL (ref 12–15.5)
IMM GRANULOCYTES # BLD AUTO: 0.1 K/MCL (ref 0–0.2)
IMM GRANULOCYTES # BLD: 1 %
LDLC SERPL CALC-MCNC: 55 MG/DL
LYMPHOCYTES # BLD: 1.6 K/MCL (ref 1–4)
LYMPHOCYTES NFR BLD: 21 %
MCH RBC QN AUTO: 30.9 PG (ref 26–34)
MCHC RBC AUTO-ENTMCNC: 31.7 G/DL (ref 32–36.5)
MCV RBC AUTO: 97.5 FL (ref 78–100)
MICROALBUMIN UR-MCNC: <0.5 MG/DL
MICROALBUMIN/CREAT UR: NORMAL MG/G{CREAT}
MONOCYTES # BLD: 0.6 K/MCL (ref 0.3–0.9)
MONOCYTES NFR BLD: 7 %
NEUTROPHILS # BLD: 5.1 K/MCL (ref 1.8–7.7)
NEUTROPHILS NFR BLD: 67 %
NONHDLC SERPL-MCNC: 75 MG/DL
NRBC BLD MANUAL-RTO: 0 /100 WBC
PLATELET # BLD AUTO: 198 K/MCL (ref 140–450)
POTASSIUM SERPL-SCNC: 4.3 MMOL/L (ref 3.4–5.1)
PROT SERPL-MCNC: 6.8 G/DL (ref 6.4–8.2)
RBC # BLD: 4.04 MIL/MCL (ref 4–5.2)
SODIUM SERPL-SCNC: 140 MMOL/L (ref 135–145)
TRIGL SERPL-MCNC: 101 MG/DL
TSH SERPL-ACNC: 3.43 MCUNITS/ML (ref 0.35–5)
URATE SERPL-MCNC: 4.2 MG/DL (ref 2.6–5.9)
WBC # BLD: 7.7 K/MCL (ref 4.2–11)

## 2025-09-13 ENCOUNTER — APPOINTMENT (OUTPATIENT)
Dept: INTERNAL MEDICINE | Age: 80
End: 2025-09-13

## 2025-10-07 ENCOUNTER — APPOINTMENT (OUTPATIENT)
Dept: CT IMAGING | Age: 80
End: 2025-10-07

## 2025-10-07 DIAGNOSIS — Z12.31 ENCOUNTER FOR SCREENING MAMMOGRAM FOR MALIGNANT NEOPLASM OF BREAST: ICD-10-CM

## 2026-10-09 ENCOUNTER — APPOINTMENT (OUTPATIENT)
Dept: CT IMAGING | Age: 81
End: 2026-10-09

## 2026-10-09 DIAGNOSIS — Z12.31 ENCOUNTER FOR SCREENING MAMMOGRAM FOR MALIGNANT NEOPLASM OF BREAST: ICD-10-CM

## (undated) DIAGNOSIS — M25.551 RIGHT HIP PAIN: ICD-10-CM

## (undated) DIAGNOSIS — M25.561 RIGHT KNEE PAIN, UNSPECIFIED CHRONICITY: Primary | ICD-10-CM